# Patient Record
Sex: FEMALE | Race: WHITE | NOT HISPANIC OR LATINO | Employment: OTHER | URBAN - METROPOLITAN AREA
[De-identification: names, ages, dates, MRNs, and addresses within clinical notes are randomized per-mention and may not be internally consistent; named-entity substitution may affect disease eponyms.]

---

## 2017-01-09 ENCOUNTER — GENERIC CONVERSION - ENCOUNTER (OUTPATIENT)
Dept: OTHER | Facility: OTHER | Age: 72
End: 2017-01-09

## 2017-02-01 ENCOUNTER — TRANSCRIBE ORDERS (OUTPATIENT)
Dept: ADMINISTRATIVE | Facility: HOSPITAL | Age: 72
End: 2017-02-01

## 2017-02-01 ENCOUNTER — APPOINTMENT (OUTPATIENT)
Dept: LAB | Facility: HOSPITAL | Age: 72
End: 2017-02-01
Attending: INTERNAL MEDICINE
Payer: MEDICARE

## 2017-02-01 ENCOUNTER — HOSPITAL ENCOUNTER (OUTPATIENT)
Dept: MRI IMAGING | Facility: HOSPITAL | Age: 72
Discharge: HOME | End: 2017-02-01
Attending: INTERNAL MEDICINE
Payer: MEDICARE

## 2017-02-01 DIAGNOSIS — E03.9 HYPOTHYROIDISM: ICD-10-CM

## 2017-02-01 DIAGNOSIS — E78.5 HYPERLIPIDEMIA: ICD-10-CM

## 2017-02-01 DIAGNOSIS — M54.2 CERVICALGIA: ICD-10-CM

## 2017-02-01 LAB
CHOLEST SERPL-MCNC: 246 MG/DL (ref 50–200)
HDLC SERPL-MCNC: 71 MG/DL (ref 40–60)
LDLC SERPL CALC-MCNC: 156 MG/DL (ref 0–100)
TRIGL SERPL-MCNC: 97 MG/DL
TSH SERPL DL<=0.05 MIU/L-ACNC: 0.67 UIU/ML (ref 0.36–3.74)

## 2017-02-01 PROCEDURE — 36415 COLL VENOUS BLD VENIPUNCTURE: CPT

## 2017-02-01 PROCEDURE — 84443 ASSAY THYROID STIM HORMONE: CPT

## 2017-02-01 PROCEDURE — 72141 MRI NECK SPINE W/O DYE: CPT

## 2017-02-01 PROCEDURE — 80061 LIPID PANEL: CPT

## 2017-02-22 ENCOUNTER — GENERIC CONVERSION - ENCOUNTER (OUTPATIENT)
Dept: OTHER | Facility: OTHER | Age: 72
End: 2017-02-22

## 2017-02-24 ENCOUNTER — ALLSCRIPTS OFFICE VISIT (OUTPATIENT)
Dept: OTHER | Facility: OTHER | Age: 72
End: 2017-02-24

## 2017-02-24 ENCOUNTER — GENERIC CONVERSION - ENCOUNTER (OUTPATIENT)
Dept: OTHER | Facility: OTHER | Age: 72
End: 2017-02-24

## 2017-02-24 ENCOUNTER — GENERIC CONVERSION - ENCOUNTER (OUTPATIENT)
Dept: INTERNAL MEDICINE CLINIC | Facility: CLINIC | Age: 72
End: 2017-02-24

## 2017-02-24 DIAGNOSIS — E03.9 HYPOTHYROIDISM: ICD-10-CM

## 2017-02-24 DIAGNOSIS — E78.5 HYPERLIPIDEMIA: ICD-10-CM

## 2017-04-24 ENCOUNTER — APPOINTMENT (OUTPATIENT)
Dept: LAB | Facility: CLINIC | Age: 72
End: 2017-04-24
Payer: MEDICARE

## 2017-04-24 ENCOUNTER — TRANSCRIBE ORDERS (OUTPATIENT)
Dept: LAB | Facility: CLINIC | Age: 72
End: 2017-04-24

## 2017-04-24 DIAGNOSIS — E78.5 HYPERLIPIDEMIA: ICD-10-CM

## 2017-04-24 DIAGNOSIS — E03.9 HYPOTHYROIDISM: ICD-10-CM

## 2017-04-24 LAB
ALBUMIN SERPL BCP-MCNC: 3.8 G/DL (ref 3.5–5)
ALP SERPL-CCNC: 110 U/L (ref 46–116)
ALT SERPL W P-5'-P-CCNC: 52 U/L (ref 12–78)
AST SERPL W P-5'-P-CCNC: 35 U/L (ref 5–45)
BILIRUB DIRECT SERPL-MCNC: 0.09 MG/DL (ref 0–0.2)
BILIRUB SERPL-MCNC: 0.28 MG/DL (ref 0.2–1)
CHOLEST SERPL-MCNC: 141 MG/DL (ref 50–200)
HDLC SERPL-MCNC: 63 MG/DL (ref 40–60)
LDLC SERPL CALC-MCNC: 64 MG/DL (ref 0–100)
PROT SERPL-MCNC: 6.5 G/DL (ref 6.4–8.2)
TRIGL SERPL-MCNC: 69 MG/DL

## 2017-04-24 PROCEDURE — 36415 COLL VENOUS BLD VENIPUNCTURE: CPT

## 2017-04-24 PROCEDURE — 80061 LIPID PANEL: CPT

## 2017-04-24 PROCEDURE — 80076 HEPATIC FUNCTION PANEL: CPT

## 2017-04-25 ENCOUNTER — ALLSCRIPTS OFFICE VISIT (OUTPATIENT)
Dept: OTHER | Facility: OTHER | Age: 72
End: 2017-04-25

## 2017-04-25 ENCOUNTER — GENERIC CONVERSION - ENCOUNTER (OUTPATIENT)
Dept: OTHER | Facility: OTHER | Age: 72
End: 2017-04-25

## 2017-04-25 DIAGNOSIS — E78.5 HYPERLIPIDEMIA: ICD-10-CM

## 2017-04-25 DIAGNOSIS — E03.9 HYPOTHYROIDISM: ICD-10-CM

## 2017-04-25 DIAGNOSIS — R19.7 DIARRHEA: ICD-10-CM

## 2017-05-03 ENCOUNTER — HOSPITAL ENCOUNTER (OUTPATIENT)
Dept: ULTRASOUND IMAGING | Facility: HOSPITAL | Age: 72
Discharge: HOME | End: 2017-05-03
Attending: INTERNAL MEDICINE
Payer: MEDICARE

## 2017-05-03 DIAGNOSIS — R19.7 DIARRHEA: ICD-10-CM

## 2017-05-03 PROCEDURE — 76705 ECHO EXAM OF ABDOMEN: CPT

## 2017-08-28 ENCOUNTER — APPOINTMENT (OUTPATIENT)
Dept: LAB | Facility: CLINIC | Age: 72
End: 2017-08-28
Payer: MEDICARE

## 2017-08-28 ENCOUNTER — TRANSCRIBE ORDERS (OUTPATIENT)
Dept: LAB | Facility: CLINIC | Age: 72
End: 2017-08-28

## 2017-08-28 DIAGNOSIS — E78.5 HYPERLIPIDEMIA: ICD-10-CM

## 2017-08-28 DIAGNOSIS — E03.9 HYPOTHYROIDISM: ICD-10-CM

## 2017-08-28 LAB
ALBUMIN SERPL BCP-MCNC: 3.5 G/DL (ref 3.5–5)
ALP SERPL-CCNC: 117 U/L (ref 46–116)
ALT SERPL W P-5'-P-CCNC: 47 U/L (ref 12–78)
AST SERPL W P-5'-P-CCNC: 29 U/L (ref 5–45)
BILIRUB DIRECT SERPL-MCNC: 0.17 MG/DL (ref 0–0.2)
BILIRUB SERPL-MCNC: 0.61 MG/DL (ref 0.2–1)
LDLC SERPL DIRECT ASSAY-MCNC: 69 MG/DL (ref 0–100)
PROT SERPL-MCNC: 6.3 G/DL (ref 6.4–8.2)
TSH SERPL DL<=0.05 MIU/L-ACNC: 1 UIU/ML (ref 0.36–3.74)

## 2017-08-28 PROCEDURE — 83721 ASSAY OF BLOOD LIPOPROTEIN: CPT

## 2017-08-28 PROCEDURE — 84443 ASSAY THYROID STIM HORMONE: CPT

## 2017-08-28 PROCEDURE — 80076 HEPATIC FUNCTION PANEL: CPT

## 2017-08-28 PROCEDURE — 36415 COLL VENOUS BLD VENIPUNCTURE: CPT

## 2017-08-30 ENCOUNTER — ALLSCRIPTS OFFICE VISIT (OUTPATIENT)
Dept: OTHER | Facility: OTHER | Age: 72
End: 2017-08-30

## 2017-08-30 DIAGNOSIS — R00.1 BRADYCARDIA: ICD-10-CM

## 2017-08-30 DIAGNOSIS — E78.5 HYPERLIPIDEMIA: ICD-10-CM

## 2017-10-17 ENCOUNTER — ALLSCRIPTS OFFICE VISIT (OUTPATIENT)
Dept: OTHER | Facility: OTHER | Age: 72
End: 2017-10-17

## 2017-10-17 DIAGNOSIS — E78.5 HYPERLIPIDEMIA: ICD-10-CM

## 2017-10-17 DIAGNOSIS — R20.8 OTHER DISTURBANCES OF SKIN SENSATION: ICD-10-CM

## 2017-10-17 DIAGNOSIS — E03.9 HYPOTHYROIDISM: ICD-10-CM

## 2017-10-17 DIAGNOSIS — M81.0 AGE-RELATED OSTEOPOROSIS WITHOUT CURRENT PATHOLOGICAL FRACTURE: ICD-10-CM

## 2017-10-18 ENCOUNTER — GENERIC CONVERSION - ENCOUNTER (OUTPATIENT)
Dept: OTHER | Facility: OTHER | Age: 72
End: 2017-10-18

## 2017-10-23 ENCOUNTER — GENERIC CONVERSION - ENCOUNTER (OUTPATIENT)
Dept: OTHER | Facility: OTHER | Age: 72
End: 2017-10-23

## 2017-11-01 NOTE — CONSULTS
Assessment  1  Cervicogenic headache (784 0) (R51)   2  Allodynia (782 0) (R20 8)   3  Migraine, unspecified, not intractable, without status migrainosus (346 90) (G43 909)    Plan  Allodynia    · (1) COMPREHENSIVE METABOLIC PANEL; Status:Active; Requested BGE:[id number];    Perform:[facility]; Due:17Oct2018; Ordered;Ordered By:Adelina Saenz;  Allodynia, Cervicogenic headache    · CarBAMazepine  MG Oral Tablet Extended Release 12 Hour (TEGretol-XR); Take 1 tab BID x 1 week, then increase to 2 tabs BID   Rx By: Krystal Reese; Dispense: 0 Days ; #E#:60 Tablet Extended Release 12 Hour; Refill: 5;Allodynia, Cervicogenic headache; SIVA = N; Faxed To: [id number]  Discussion/Summary  Discussion Summary:   Migraine with aura without status migrainosus twice a monthCan continue to take Fioricet abortively as this works well for her  We discussed if her migraines become more frequent, discussed notifying me immediately as we could try different medication then  Discussed that frequent use of this medication 3/more times a week for several weeks, can cause rebound headache  At this time, this is not a concern as she uses it twice a month at a maximum  Given this frequency no need for preventative treatment at this time  neuralgia and allodynia and neuralgiform mild to moderate pain daily in the right temporo-occipital region  Tegretol 100 mg daily x 1 week, increased to 100 BID  Repeat CMP in 3 months time to monitor for pancytopenia, hyponatremia, elevated liver enzymes  MRI C spine 2/2017 with no spinal stenosis or other significant abnormalities noted  She has mild narrowing of the C5-6 foramen- which could be contributory but would not completely explain her current symptoms  have reviewed her MRI brain and MRA head done in 2014 when this first started and this did not show any acute changes or significant structural abnormalities   She has no new features to her headaches, thus will no repeat neuroimaging at this time  If the nature of her headaches change, then will obtain repeat MRI brain  Counseling Documentation With Imm: The patient was counseled regarding  Medication SE Review and Pt Understands Tx: Possible side effects of new medications were reviewed with the patient/guardian today  The treatment plan was reviewed with the patient/guardian  The patient/guardian understands and agrees with the treatment plan   Headache [facility]:  The patient was counseled regarding;   Discussed side effects of all medications prescribed today to the patient in detail  Patient education was completed today and we also discussed precautions for rebound headaches  --   When patient has a moderate to severe headache, they should seek rest, initiate relaxation and apply cold compresses to the head  Also recommended to the patient :  1  Maintain regular sleep schedule -- 2  Limit over the counter medications  (No more than 3 times a week)  -- 3  Maintain headache diary  -- 4  Limit caffeine to 1-2 cups a day or less  -- 5  Avoid dietary trigger  (list given to the patient and reviewed with them)  -- 6  Patient is to have regular frequent meals to prevent headache onset  Chief Complaint  Chief Complaint Free Text Note Form: Patient presents for a consultation for right side head pain  History of Present Illness  HPI: Ms Maria C Miramontes is a pleasant 67 yo female seen in consultation for headaches starting March 2014, states then had a thunderclap headache that almost knocked her over, states that was quick but had a lower grade headache- 4/10 headache the rest of the day with photophobia but no other associated deficits  She states she had no associated paresis, vision changes, vertigo or word finding or cognitive deficits then  states this sudden sharp headache occurred right after she turned her head to the right   At this point, she was recovering from an infection on her frontal scalp s/p moes procedure per her  States the Edison's procedure was in the midfrontal area- not the right temporo-occipital region where she has the pain  since then has had almost daily headaches now mostly mild which she can work through- states when wind or cool temperatures touch her head, she has severe pain, states brushing her hair does not bother her  States has a tingling sensation in this area as well if she avoid the cool temperatures or keeps her scalp warm with a hat or scarf she will not have the severe pain regards to migraines,has a migraine once a month where she has vision changes as her aura, has dull moderately severe headache, photophobia and phonophobia, no emesis  States she goes and sleeps in a quiet dark room after taking Fioricet, and this resolves her headache  States has mild nausea  States occasional rhinorrhea with her aura as well however tells me this is not new  Denies migraines or regular headaches earlier in her life  tried: Neurontin, Lyrica, Elavil, Topamax- adverse effects of blurry vision, diarrhea (15 times/day)weather changes, wind and cool causes allodynia to that region of her headwhen she was young (childhood) had a skull fracture on the left side with no residual deficits  family history of migraines  No personal history of regular headaches/migraines earlier in life  personal or family history of strokes, seizures, intracranial bleeds, or aneurysm rupture  tobacco use, significant alcohol use and denies drug use  HTN, CAD, arrhythmias  Denies sleeping difficulties  Denies balance troubles or falls  States had positional dizziness with headaches in 2014 but no longer  States no longer takes Meclizine  Neurology [facility]:  Headache: On a scale of 0-10, the pain severity is a 1  the headaches started at age 78   no accidents or injury prior to the onset of headaches  Headaches are occurring Mild headaches occur daily, migraines once in 1-2 months-- and-- during various time of the day  headache lasts for hour(s)  Currently the pain is on the entire right side,-- in the temporal region-- and-- in the occipital region  Warning(s) prior to headache include photopsia-- and-- rhinorrhea  Usual headache is described as throbbing, sharp, electric and dull  Associated symptoms include photophobia,-- phonophobia,-- loss of appetite,-- nausea,-- runny or stuffed up nose,-- with darkness,-- stiff or sore neck,-- inability to work-- and-- the associated features occur about once a month with her migraines not with her daily dull headaches, but-- no tinnitus,-- no lacrimation,-- no sensitivity to smell,-- no flushing,-- no blurred vision,-- no vomiting,-- no lightheadedness or dizziness,-- no tingling or numbness of the hands-- and-- no tingling or numbness of the feet  Headaches are made worse by not while coughing,-- not while sneezing,-- not while moving bowel,-- not while running or exercising,-- not while bending over-- and-- not while walking  Headaches are triggered by changes in the weather, but-- not by fatigue,-- not stress/tension,-- not eating certain foods,-- not while drinking alcohol,-- not with certain medication,-- not by coughing,-- not by chewing,-- not while oversleeping-- and-- not when lying down  Review of Systems  Neurological ROS:  Constitutional: recent weight loss  HEENT: blurred vision  Cardiovascular:  no chest pain or pressure, no palpitations present, the heart rate was not rapid or irregular, no swelling in the arms or legs, no poor circulation  Respiratory:  no unusual or persistant cough, no shortness of breath with or without exertion  Gastrointestinal:  no nausea, no vomiting, no diarrhea, no abdominal pain, no changes in bowel habits, no melena, no loss of bowel control  Genitourinary:  no incontinence, no feelings of urinary urgency, no increase in frequency, no urinary hesitancy, no dysuria, no hematuria  Musculoskeletal: head/neck/back pain  Integumentary  no masses, no rash, no skin lesions, no livedo reticularis  Psychiatric:  no anxiety, no depression, no mood swings, no psychiatric hospitalizations, no sleep problems  Endocrine  no unusual weight loss or gain, no excessive urination, no excessive thirst, no hair loss or gain, no hot or cold intolerance, no menstrual period change or irregularity, no loss of sexual ability or drive, no erection difficulty, no nipple discharge  Hematologic/Lymphatic:  no unusual bleeding, no tendency for easy bruising, no clotting skin or lumps  Neurological General: headache,-- lightheadedness,-- snoring-- and-- waking up at night  Neurological Mental Status:  no confusion, no mood swings, no alteration or loss of consciousness, no difficulty expressing/understanding speech, no memory problems  Neurological Cranial Nerves: vertigo or dizziness  Neurological Motor findings include:  no tremor, no twitching, no cramping(pre/post exercise), no atrophy  Neurological Coordination: balance difficulties  Neurological Sensory:  no numbness, no pain, no tingling, does not fall when eyes closed or taking a shower  Neurological Gait:  no difficulty walking, not falling to one side, no sensation of being pushed, has not had falls  ROS Reviewed:   ROS reviewed  Active Problems    1  Abnormal loss of weight (783 21) (R63 4)   2  Advance directive discussed with patient (V65 49) (Z71 89)   3  Atypical chest pain (786 59) (R07 89)   4  Bradycardia (427 89) (R00 1)   5  Depression with anxiety (300 4) (F41 8)   6  Encounter for other general examination (V70 8) (Z00 8)   7  Encounter for screening colonoscopy (V76 51) (Z12 11)   8  Encounter for screening mammogram for malignant neoplasm of breast (V76 12) (Z12 31)   9  Esophageal reflux (530 81) (K21 9)   10  Glaucoma (365 9) (H40 9)   11  History of malignant neoplasm of colon (V10 05) (Z85 038)   12  Hyperlipidemia (272 4) (E78 5)   13  Hypothyroidism (244 9) (E03 9)   14  Lumbar disc displacement without myelopathy (722 10) (M51 26)   15  Lumbar radiculopathy (724 4) (M54 16)   16  Migraine, unspecified, not intractable, without status migrainosus (346 90) (G43 909)   17  Need for influenza vaccination (V04 81) (Z23)   18  Osteoporosis (733 00) (M81 0)   19  Pernicious anemia (281 0) (D51 0)    Past Medical History    1  History of Allergic rhinitis (477 9) (J30 9)   2  History of Allergic rhinitis (477 9) (J30 9)   3  History of Backache (724 5) (M54 9)   4  History of Chronic constipation (564 00) (K59 09)   5  History of Chronic sinusitis, unspecified location (473 9) (J32 9)   6  History of Encounter for screening colonoscopy (V76 51) (Z12 11)   7  History of Encounter for screening mammogram for malignant neoplasm of breast (V76 12) (Z12 31)   8  History of Headache (784 0) (R51)   9  History of hypercholesterolemia (V12 29) (Z86 39)   10  History of malignant neoplasm of colon (V10 05) (Z85 038)   11  History of thyroid disease (V12 29) (Z86 39)   12  History of Insomnia (780 52) (G47 00)   13  History of Lower back pain (724 2) (M54 5)   14  History of Migraine, hemiplegic, with status migrainosus (346 32) (G43 401)   15  History of Need for prophylactic vaccination and inoculation against influenza (V04 81)  (Z23)   16  History of Neurodermatitis (698 3) (L28 0)   17  History of Thrush, oral (112 0) (B37 0)   18  History of Vertigo (780 4) (R42)   19  History of Visit for screening mammogram (T30 71) (Z12 31)  Active Problems And Past Medical History Reviewed: The active problems and past medical history were reviewed and updated today  Surgical History  1  History of Appendectomy   2  History of Breast Surgery Puncture Aspiration Of Cyst Right Breast   3  History of Hemicolectomy   4  History of Hysterectomy   5  History of Mohs Micrographic Surgery Head   6  History of Oophorectomy   7  History of Partial Colectomy    Family History  Mother    1   Family history of Colon Cancer (V16 0)   2  Family history of Lung Cancer (V16 1)   3  Family history of Stroke Syndrome (V17 1)  Father    4  Family history of Esophageal Cancer (V16 0)  Family History    5  Family history of Breast Cancer (V16 3)   6  Family history of Coronary Artery Disease (V17 49)   7  Denied: Family history of Drug use  Family History Reviewed: The family history was reviewed and updated today  Social History     · Always uses sunscreen   · Being A Social Drinker   · Caffeine use (V49 89) (F15 90)   · Dental care, regularly   · Lives independently with spouse   · Never a smoker   · No drug use   · Uses Safety Equipment - Seatbelts  Social History Reviewed: The social history was reviewed and updated today  Current Meds   1  Atorvastatin Calcium 40 MG Oral Tablet; TAKE 1 TABLET BY MOUTH ONCE DAILY  Requested for: 28QZK0485; Last Rx:15Jun2017 Ordered   2  Butalbital-APAP-Caffeine -40 MG Oral Capsule; TAKE 1 EVERY EIGHT HOURS PRN FOR HA; Therapy: 43EHJ6059 to (Evaluate:59Hqk1548)  Requested for: 00Kgs8748; Last Rx:73Azu9882 Ordered   3  Ibuprofen 800 MG Oral Tablet; TAKE 1 TABLET 3 TIMES DAILY AS NEEDED; Therapy: 34NDW7518 to (21 )  Requested for: 75NKM1287; Last BM:34YDH8203 Ordered   4  Levothyroxine Sodium 75 MCG Oral Tablet; TAKE 1 TABLET DAILY; Therapy: 04RKY3012 to ([facility])  Requested for: 79Klz4759; Last Rx:87Wmn3487 Ordered   5  Meclizine HCl - 25 MG Oral Tablet; TAKE 1 TABLET Every 8 hours PRN; Therapy: 85IMU1297 to (Evaluate:35Nxr6310)  Requested for: 20BRP9232; Last Rx:74Vuu3439 Ordered   6  Rosuvastatin Calcium 5 MG Oral Tablet; Take 1 tablet daily; Therapy: 47Rrj8582 to (Last Rx:93Nrx7915)  Requested for: 20KLX5799 Ordered    Allergies  1  Avelox TABS   2  Demerol TABS   3  Erythromycin Derivatives    4   Shellfish    Vitals  Signs   Recorded: 41LNP0363 11:59AM   Heart Rate: 60  Respiration: 14  Systolic: 241  Diastolic: 64  Height: 5 ft 3 5 in  Weight: 118 lb   BMI Calculated: 20 58  BSA Calculated: 1 55  O2 Saturation: 98    Physical Exam   Constitutional  General Appearance: Appears appropriate for age, healthy, well developed, appropriately groomed and appropriately dressed   Eyes  Ophthalmoscopic examination: Vision is grossly normal  Gross visual field testing by confrontation shows no abnormalities  EOMI in both eyes  Conjunctivae clear  Eyelids normal palpebral fissures equal  Orbits exhibit normal position  No discharge from the eyes  PERRL  External inspection of ears and nose: Normal      Neck  Neck and thyroid: Normal to inspection and palpation  Pulmonary  Respiratory effort: Lungs are clear bilaterally  Cardiovascular  Auscultation of heart: Rate is regular  Rhythm is regular  Peripheral vascular exam: Normal pulses throughout, no tenderness, erythema or swelling  Musculoskeletal  Gait and station: Abnormal  -- Romberg with mild sway  Muscle strength: Normal strength throughout  Muscle tone: No atrophy, abnormal movements, flaccidity, cogwheeling or spasticity  Range of motion: Normal     Stability: Normal     Inspection of temporomandibular joint appears normal    Neurologic  Orientation to person, place, and time: Normal    Attention span and concentration: Normal thought process and attention span  Language: Names objects, able to repeat phrases and speaks spontaneously  Fund of knowledge: Normal vocabulary with appropriate knowledge of current events and past history  Sensation: Intact sensation to pinprick, temperature, vibration, and proprioception in all four extremities  Reflexes: DTR's are normal and symmetric bilaterally  Babinski's reflex is negative bilaterally  No pathologic ankle clonus  Coordination: Cerebellum function intact  No involuntary movement or psychomotor activity  Motor System: No pronator drift  Upper Extremities: Normal to inspection  No tenderness over the upper extremities bilaterally   No instability bilaterally  Strength: Motor strength is 5/5 bilaterally  Normal muscle tone bilaterally  Muscle bulk: Muscle bulk is normal bilaterally  Full ROM bilaterally  Lower Extremities: Normal to inspection and palpation  No tenderness of the lower extremities bilaterally  Exhibits no instability bilaterally  Strength: Motor strength is 5/5 bilaterally  Normal muscle tone bilaterally  Muscle Bulk: Muscle bulk is normal bilaterally  Full ROM bilaterally  Cranial Nerve Exam  II: Normal with no deficit  III,IV, VI: Normal with no deficit  V: Normal with no deficit  VII: Normal with no deficit  VIII: Normal with no deficit  IX: Normal with no deficit  X: Normal with no deficit  XI: Normal with no deficit  XII: Normal with no deficit  Recent and remote memory: Intact      Mood and affect: Normal        Future Appointments    Date/Time Provider Specialty Site   11/10/2017 10:15 AM Ruby Austin DO Internal Medicine [facility] 64   01/17/2018 01:30 PM Kewrin Tucker MD Neurology [facility] 176       Signatures   Electronically signed by : Geo Diaz MD; Oct 17 2017  1:26PM EST                       (Author)

## 2017-11-10 ENCOUNTER — ALLSCRIPTS OFFICE VISIT (OUTPATIENT)
Dept: OTHER | Facility: OTHER | Age: 72
End: 2017-11-10

## 2017-11-11 NOTE — PROGRESS NOTES
Assessment    1  Irritable bowel syndrome (564 1) (K58 9)  2  Lumbar radiculopathy (724 4) (M54 16)  3  Depression with anxiety (300 4) (F41 8)  4  Esophageal reflux (530 81) (K21 9)    Plan  Hyperlipidemia    · (1) LIPID PANEL, FASTING; Status:Active; Requested for:10Nov2017;   Hyperlipidemia, Hypothyroidism    · (1) COMPREHENSIVE METABOLIC PANEL; Status:Active; Requested for:10Nov2017;   Hyperlipidemia, Hypothyroidism, Irritable bowel syndrome    · Follow-up visit in 4 Months Evaluation and Treatment  Follow-up  Status: Hold For - Scheduling Requested for: 40SAC5540  Hypothyroidism    · (1) TSH; Status:Active; Requested for:10Nov2017;   Osteoporosis    · (1) VITAMIN D 25-HYDROXY; Status:Active; Requested for:10Nov2017;     Discussion/Summary  Discussion Summary:   Increase fiber, walk as tolerated  Deferred prevnar, aware of shingrix  Continue statin, labs reviewed  Has optho followup  Rto 6 months/prn1     Medication SE Review and Pt Understands Tx: Possible side effects of new medications were reviewed with the patient/guardian today  The treatment plan was reviewed with the patient/guardian  The patient/guardian understands and agrees with the treatment plan    Self Referrals:   Self Referrals: No       1 Amended By: Malorie Narayan; Nov 10 2017 9:26 PM EST    Chief Complaint  Chief Complaint Free Text Note Form: Pt presents for routine f/up exam  Pt feels well today  No falls  No refills needed today  Appetite is normal per patient  Chief Complaint Chronic Condition [facility]: Patient is here today for follow up of chronic conditions described in HPI  History of Present Illness  HPI: Pt doing ok  Ongoing concern for eye sxs,pressures but has been stable  No new sxs  No chest pain  Limited activity due to eye issues  No falls  Back sxs tolerable  Bowels 1  1,2  fluctuate, loose now  Tolerating statin2        1 Amended By: Malorie Narayan; Nov 10 2017 9:24 PM EST   2 Amended By: Malorie Narayan;  Nov 10 2017 9:26 PM EST    Review of Systems  Complete-Female:  Constitutional:1  No fever, no chills, feels well, no tiredness, no recent weight gain or weight loss1   Eyes:1  eyesight problems1   ENT:1  no complaints of earache, no loss of hearing, no nose bleeds, no nasal discharge, no sore throat, no hoarseness1   Cardiovascular:1  No complaints of slow heart rate, no fast heart rate, no chest pain, no palpitations, no leg claudication, no lower extremity edema1   Respiratory:1  No complaints of shortness of breath, no wheezing, no cough, no SOB on exertion, no orthopnea, no PND1   Gastrointestinal:1  No complaints of abdominal pain, no constipation, no nausea or vomiting, no diarrhea, no bloody stools1   Genitourinary:1  No complaints of dysuria, no incontinence, no pelvic pain, no dysmenorrhea, no vaginal discharge or bleeding1   Musculoskeletal:1  arthralgias1 ,-- myalgias1 -- and-- joint stiffness1   Integumentary:1  No complaints of skin rash or lesions, no itching, no skin wounds, no breast pain or lump1   Neurological:1  No complaints of headache, no confusion, no convulsions, no numbness, no dizziness or fainting, no tingling, no limb weakness, no difficulty walking1   Psychiatric:1  Not suicidal, no sleep disturbance, no anxiety or depression, no change in personality, no emotional problems1   Endocrine:1  No complaints of proptosis, no hot flashes, no muscle weakness, no deepening of the voice, no feelings of weakness1   Hematologic/Lymphatic:1  No complaints of swollen glands, no swollen glands in the neck, does not bleed easily, does not bruise easily1         1 Amended By: Caryn Caicedo; Nov 10 2017 9:25 PM EST    Active Problems  1  Abnormal loss of weight (783 21) (R63 4)  2  Advance directive discussed with patient (V65 49) (Z71 89)  3  Allodynia (782 0) (R20 8)  4  Atypical chest pain (786 59) (R07 89)  5  Bradycardia (427 89) (R00 1)  6  Cervicogenic headache (784 0) (R51)  7  Depression with anxiety (300 4) (F41 8)  8  Encounter for other general examination (V70 8) (Z00 8)  9  Encounter for screening colonoscopy (V76 51) (Z12 11)  10  Encounter for screening mammogram for malignant neoplasm of breast (V76 12) (Z12 31)  11  Esophageal reflux (530 81) (K21 9)  12  Glaucoma (365 9) (H40 9)  13  History of malignant neoplasm of colon (V10 05) (Z85 038)  14  Hyperlipidemia (272 4) (E78 5)  15  Hypothyroidism (244 9) (E03 9)  16  Lumbar disc displacement without myelopathy (722 10) (M51 26)  17  Lumbar radiculopathy (724 4) (M54 16)  18  Migraine, unspecified, not intractable, without status migrainosus (346 90) (G43 909)  19  Need for influenza vaccination (V04 81) (Z23)  20  Osteoporosis (733 00) (M81 0)  21  Pernicious anemia (281 0) (D51 0)    Past Medical History  1  History of hypercholesterolemia (V12 29) (Z86 39)  2  History of malignant neoplasm of colon (V10 05) (Z85 038)  3  History of Insomnia (780 52) (G47 00)  4  History of Neurodermatitis (698 3) (L28 0)  Active Problems And Past Medical History Reviewed: The active problems and past medical history were reviewed and updated today  Surgical History  1  History of Appendectomy  2  History of Breast Surgery Puncture Aspiration Of Cyst Right Breast  3  History of Hemicolectomy  4  History of Hysterectomy  5  History of Mohs Micrographic Surgery Head  6  History of Oophorectomy  7  History of Partial Colectomy  Surgical History Reviewed: The surgical history was reviewed and updated today  Family History  Mother   1  Family history of Colon Cancer (V16 0)  2  Family history of Lung Cancer (V16 1)  3  Family history of Stroke Syndrome (V17 1)  Father   4  Family history of Esophageal Cancer (V16 0)  Family History   5  Family history of Breast Cancer (V16 3)  6  Family history of Coronary Artery Disease (V17 49)  7  Denied: Family history of Drug use  Family History Reviewed:    The family history was reviewed and updated today  Social History     · Always uses sunscreen   · Being A Social Drinker   · Caffeine use (V49 89) (F15 90)   · Dental care, regularly   · Lives independently with spouse   · Never a smoker   · No drug use   · Uses Safety Equipment - Seatbelts  Social History Reviewed: The social history was reviewed and updated today  The social history was reviewed and is unchanged  Current Meds  1  Baclofen 10 MG Oral Tablet; Take half tab nightly x 1 week, then 1 tab nightly; Therapy: 13KTP4594 to (Last Rx:23Oct2017) Ordered  2  Butalbital-APAP-Caffeine -40 MG Oral Capsule; TAKE 1 EVERY EIGHT HOURS PRN FOR HA; Therapy: 64QTA6821 to (Evaluate:01Fhq1453)  Requested for: 18Oia8663; Last Rx:00Blc8308 Ordered  3  CarBAMazepine  MG Oral Tablet Extended Release 12 Hour; Take 1 tab BID x 1 week, then increase to 2 tabs BID; Therapy: 34NMR6858 to (Last Rx:17Oct2017) Ordered  4  Combigan 0 2-0 5 % Ophthalmic Solution; Therapy: (Recorded:10Nov2017) to Recorded  5  Ibuprofen 800 MG Oral Tablet; TAKE 1 TABLET 3 TIMES DAILY AS NEEDED; Therapy: 31PTL2489 to (Elizabeth Taylor)  Requested for: 63NGU9197; Last Rx:51Lua5452 Ordered  6  Levothyroxine Sodium 75 MCG Oral Tablet; TAKE 1 TABLET DAILY; Therapy: 17LEV9416 to (Yasmin Cohen)  Requested for: 66Rtp4037; Last Rx:05Nuj9689 Ordered  7  Meclizine HCl - 25 MG Oral Tablet; TAKE 1 TABLET Every 8 hours PRN; Therapy: 62YHF4270 to (Evaluate:97Nnf0441)  Requested for: 85JWQ8395; Last Rx:31Jan2017 Ordered  8  Rosuvastatin Calcium 5 MG Oral Tablet; Take 1 tablet daily; Therapy: 96Gak2042 to (Last Rx:48Nqv8233)  Requested for: 65YCP0499 Ordered  Medication List Reviewed: The medication list was reviewed and updated today  Allergies  1  Avelox TABS  2  Demerol TABS  3  Erythromycin Derivatives  4   Shellfish    Vitals  Vital Signs    Recorded: 12TNO2269 09:25PM Recorded: 78ZJZ0596 09:53AM   Temperature 1 95 F, Tympanic   Systolic 5272 1   Diastolic 246 1 Height 1 5 ft 3 5 in   Weight 1 122 lb 6 oz   BMI Calculated 1 21 34   BSA Calculated 1 1 58        1  Amended By: Governor Dize; Nov 10 2017 9:25 PM EST   Physical Exam   Constitutional1   General appearance: No acute distress, well appearing and well nourished  1   Eyes1   Conjunctiva and lids: No swelling, erythema or discharge  1   Pupils and irises: Equal, round and reactive to light  1   Ears, Nose, Mouth, and Throat1   External inspection of ears and nose: Normal 1   Otoscopic examination: Tympanic membranes translucent with normal light reflex  Canals patent without erythema  1   Nasal mucosa, septum, and turbinates: Normal without edema or erythema  1   Oropharynx: Normal with no erythema, edema, exudate or lesions  1   Pulmonary1   Respiratory effort: No increased work of breathing or signs of respiratory distress  1   Auscultation of lungs: Clear to auscultation  1   Cardiovascular1   Palpation of heart: Normal PMI, no thrills  1   Auscultation of heart: Normal rate and rhythm, normal S1 and S2, without murmurs  1   Examination of extremities for edema and/or varicosities: Normal 1   Carotid pulses: Normal 1   Abdomen1   Abdomen: Non-tender, no masses  1   Liver and spleen: No hepatomegaly or splenomegaly  1   Lymphatic1   Palpation of lymph nodes in neck: No lymphadenopathy  1   Musculoskeletal1   Gait and station: Normal 1   Digits and nails: Normal without clubbing or cyanosis  1   Inspection/palpation of joints, bones, and muscles: Normal 1   Skin1   Skin and subcutaneous tissue: Normal without rashes or lesions  1   Neurologic1   Cranial nerves: Cranial nerves 2-12 intact  1   Reflexes: 2+ and symmetric  1   Sensation: No sensory loss  1   Psychiatric1   Orientation to person, place, and time: Normal 1   Mood and affect: Normal 1          1 Amended By: Governor Diez; Nov 10 2017 9:26 PM EST    Health Management  Encounter for screening colonoscopy   COLONOSCOPY; every 5 years;  Last 16ILR6489; Next Due: 01OVT0077; Active  Health Maintenance   Medicare Annual Wellness Visit; every 1 year; Last 14Wtm7770; Next Due: 43Pil6327;  Active    Future Appointments    Date/Time Provider Specialty Site   01/17/2018 01:30 PM Sintia Hampton MD Neurology [facility] 176       Signatures   Electronically signed by : Cristina Landry DO; Nov 10 2017 10:28AM EST                       (Author)    Electronically signed by : Cristina Landry DO; Nov 10 2017  9:27PM EST                       (Author)

## 2018-01-07 DIAGNOSIS — Z12.31 ENCOUNTER FOR SCREENING MAMMOGRAM FOR MALIGNANT NEOPLASM OF BREAST: ICD-10-CM

## 2018-01-10 NOTE — PROGRESS NOTES
Assessment    1  Bradycardia (427 89) (R00 1)    Plan  Bradycardia    · 3 - [facility] (CARDIOLOGY ) Co-Management  *  Status: Hold For - Scheduling   Requested for: 30Aug2017  are Referring to a non- Preferred Provider : Established Patient  Care Summary provided  : Yes  Bradycardia, Hyperlipidemia, Hypothyroidism    · Follow-up visit in 3 months Evaluation and Treatment  Follow-up  Status: Hold For -  Scheduling  Requested for: 30Aug2017  Depression with anxiety    · *VB - PHQ-9 Tool; Status:Active - Retrospective By Protocol Authorization; Requested  for:30Aug2017; Health Maintenance    · There are many exercise options for seniors ; Status:Complete - Retrospective By  Protocol Authorization;   Done: 18ADE3474   · There ways to avoid falling ; Status:Complete - Retrospective By Protocol Authorization;    Done: 40RFR6760   · These are things you can do to prevent falls in and around the home ; Status:Complete -  Retrospective By Protocol Authorization;   Done: 75UXY2516   · We recommend you modify your diet to achieve and maintain a healthy weight  Being  underweight may increase your risk of developing health problems from vitamin and  mineral deficiencies  We recommend a balanced diet rich in fruits and vegetables  You  may also consider increasing your calorie intake by eating more frequently or adding  nuts, avocados, and low-fat cheese or milk to your meals  Please let us know  if you would like to learn more about your nutrition and calorie needs, and additional  options to help you achieve your weight goals ; Status:Complete - Retrospective By  Protocol Authorization;   Done: 13KHQ4060   · Medicare Annual Wellness Visit ; every 1 year; Last 30Aug2017; Next 30Aug2018;  Status:Active  Hyperlipidemia    · Rosuvastatin Calcium 5 MG Oral Tablet (Crestor); Take 1 tablet daily   · (1) LIPID PANEL, FASTING; Status:Active;  Requested for:30Aug2017;    · Fluzone High-Dose 0 5 ML Intramuscular Suspension Prefilled Syringe;  INJECT 0 5  ML Intramuscular; To Be Done: 75Geu0765  Hypothyroidism    · Levothyroxine Sodium 75 MCG Oral Tablet (Synthroid); TAKE 1 TABLET DAILY  Migraine, unspecified, not intractable, without status migrainosus    · Butalbital-APAP-Caffeine -40 MG Oral Capsule; TAKE 1 EVERY EIGHT  HOURS PRN FOR HA    Discussion/Summary  Impression: Subsequent Annual Wellness Visit  Self Referrals: No       Possible side effects of new medications were reviewed with the patient/guardian today  The treatment plan was reviewed with the patient/guardian  The patient/guardian understands and agrees with the treatment plan      Chief Complaint  Pt presents for WEll Exam today  Pt asking to be changed to a new chol  medication  States she can't take lipitor anymore  No falls  REfills done as requested  Appetite is normal per patient  She states she doesn't sleep much at night, this isn't new for her  Advance Directives  Advance Directive  [facility]:   The patient is in agreement to receive the Advance Care Planning service    YES - Patient has an advance health care directive  The patient has a living will located  in patient's home  Capacity/Competence: This patient has full decision making capacity for discussion of advance care planning and This patient has full decision making competency for discussion of advance care planning  History of Present Illness  Welcome to Medicare and Wellness Visits: The patient is being seen for the subsequent annual wellness visit  Drug and Alcohol Use: The patient has never smoked cigarettes  The patient reports never drinking alcohol  She has never used illicit drugs  Diet and Physical Activity: Current diet includes well balanced meals  She exercises daily  Exercise: walking  Advance Directives: Advance directives: living will and advance directives     Co-Managers and Medical Equipment/Suppliers: See Patient Care Team   Reviewed Updated [facility]: Last Medicare Wellness Visit Information was reviewed, patient interviewed, no change since last AWV  Preventive Quality Program 65 and Older: Falls Risk: The patient fell times in the past 12 months  The patient is currently asymptomatic Symptoms Include:  Associated symptoms:  No associated symptoms are reported  The patient currently has no urinary incontinence symptoms  Patient Care Team    Care Team Member Role Specialty Office Number   Lilo Morris DO Specialist Pain Management [phone]   Iqra Marquez, [address] [place] Specialist Pain Management [phone]   Patrick Herr MD Specialist Gastroenterology Adult  DO  Pain Management [phone]   [facility]  [facility] [phone]     Active Problems    1  Abnormal loss of weight (783 21) (R63 4)   2  Advance directive discussed with patient (V65 49) (Z71 89)   3  Atypical chest pain (786 59) (R07 89)   4  Depression with anxiety (300 4) (F41 8)   5  Encounter for other general examination (V70 8) (Z00 8)   6  Encounter for screening colonoscopy (V76 51) (Z12 11)   7  Encounter for screening mammogram for malignant neoplasm of breast (V76 12)   (Z12 31)   8  Esophageal reflux (530 81) (K21 9)   9  Glaucoma (365 9) (H40 9)   10  History of malignant neoplasm of colon (V10 05) (Z85 038)   11  Hyperlipidemia (272 4) (E78 5)   12  Hypothyroidism (244 9) (E03 9)   13  Lumbar disc displacement without myelopathy (722 10) (M51 26)   14  Lumbar radiculopathy (724 4) (M54 16)   15  Migraine, unspecified, not intractable, without status migrainosus (346 90) (G43 909)   16  Need for influenza vaccination (V04 81) (Z23)   17  Osteoporosis (733 00) (M81 0)   18   Pernicious anemia (281 0) (D51 0)    Past Medical History    · History of Allergic rhinitis (477 9) (J30 9)   · History of Allergic rhinitis (477 9) (J30 9)   · History of Backache (724 5) (M54 9)   · History of Chronic constipation (564 00) (K59 09)   · History of Chronic sinusitis, unspecified location (473 9) (J32 9)   · History of Encounter for screening colonoscopy (V76 51) (Z12 11)   · History of Encounter for screening mammogram for malignant neoplasm of breast  (V76 12) (Z12 31)   · History of Headache (784 0) (R51)   · History of hypercholesterolemia (V12 29) (Z86 39)   · History of malignant neoplasm of colon (V10 05) (Z85 038)   · History of thyroid disease (V12 29) (Z86 39)   · History of Insomnia (780 52) (G47 00)   · History of Lower back pain (724 2) (M54 5)   · History of Migraine, hemiplegic, with status migrainosus (346 32) (G43 401)   · History of Need for prophylactic vaccination and inoculation against influenza (V04 81)  (Z23)   · History of Neurodermatitis (698 3) (L28 0)   · History of Thrush, oral (112 0) (B37 0)   · History of Vertigo (780 4) (R42)   · History of Visit for screening mammogram (V76 12) (Z12 31)    The active problems and past medical history were reviewed and updated today  Surgical History    · History of Appendectomy   · History of Breast Surgery Puncture Aspiration Of Cyst Right Breast   · History of Hemicolectomy   · History of Hysterectomy   · History of Mohs Micrographic Surgery Head   · History of Oophorectomy   · History of Partial Colectomy    The surgical history was reviewed and updated today  Family History  Mother    · Family history of Colon Cancer (V16 0)   · Family history of Lung Cancer (V16 1)   · Family history of Stroke Syndrome (V17 1)  Father    · Family history of Esophageal Cancer (V16 0)  Family History    · Family history of Breast Cancer (V16 3)   · Family history of Coronary Artery Disease (V17 49)    The family history was reviewed and updated today         Social History    · Always uses sunscreen   · Being A Social Drinker   · Caffeine use (V49 89) (F15 90)   · Dental care, regularly   · Lives independently with spouse   · Never a smoker   · No drug use   · Uses Safety Equipment - Seatbelts  The social history was reviewed and updated today  The social history was reviewed and is unchanged  Current Meds   1  Adult Aspirin EC Low Strength 81 MG Oral Tablet Delayed Release; Take 1 tablet daily   as directed Recorded   2  Atorvastatin Calcium 40 MG Oral Tablet; TAKE 1 TABLET BY MOUTH ONCE DAILY    Requested for: 22ZPQ5170; Last Rx:15Jun2017 Ordered   3  Butalbital-APAP-Caffeine -40 MG Oral Capsule; TAKE 1 EVERY EIGHT HOURS   PRN FOR HA;   Therapy: 24YPR0621 to (Evaluate:35Dus5189)  Requested for: 87FWC2005; Last   UA:63AQN3849 Ordered   4  Ibuprofen 800 MG Oral Tablet; TAKE 1 TABLET 3 TIMES DAILY AS NEEDED; Therapy: 51NLT9546 to (03 17 74 30 53)  Requested for: 52EQN5561; Last   MC:44SUP5326 Ordered   5  Levothyroxine Sodium 75 MCG Oral Tablet; TAKE 1 TABLET DAILY; Therapy: 21SNA7233 to (Evaluate:14Gis3488)  Requested for: 92HJX2704; Last   Rx:42Szc7701 Ordered   6  Meclizine HCl - 25 MG Oral Tablet; TAKE 1 TABLET Every 8 hours PRN; Therapy: 87MMM7354 to (Evaluate:92Wex4683)  Requested for: 46XEQ9333; Last   Rx:31Jan2017 Ordered   7  Omega 3 CAPS; Therapy: (Recorded:30Jan2013) to Recorded   8  Timolol Maleate SOLN;   Therapy: (Recorded:25Apr2017) to Recorded    The medication list was reviewed and updated today  Allergies    1  Avelox TABS   2  Demerol TABS   3  Erythromycin Derivatives    4  Shellfish    Immunizations   1 2 3 4    Influenza  15-Nov-2013 23-Oct-2014 27-Oct-2015 08-Dec-2016    Tetanus  2000       Zoster  17-Dec-2009        Vitals  Signs    Temperature: 96 6 F, Tympanic  Heart Rate: 58  Height: 5 ft 3 5 in  Weight: 118 lb 2 oz  BMI Calculated: 20 6  BSA Calculated: 1 55  O2 Saturation: 97, RA    Health Management  Encounter for screening colonoscopy   COLONOSCOPY; every 5 years; Last 62IVM4745; Next Due: 17DVS7265; Active  Health Maintenance   Medicare Annual Wellness Visit; every 1 year; Last 94Kbc8019; Next Due: 60Iax0966;   Active    Signatures Electronically signed by : Gurpreet Warner DO; Aug 30 2017  9:39AM EST                       (Author)

## 2018-01-12 VITALS
HEART RATE: 60 BPM | RESPIRATION RATE: 14 BRPM | DIASTOLIC BLOOD PRESSURE: 64 MMHG | OXYGEN SATURATION: 98 % | WEIGHT: 118 LBS | HEIGHT: 64 IN | BODY MASS INDEX: 20.14 KG/M2 | SYSTOLIC BLOOD PRESSURE: 108 MMHG

## 2018-01-12 VITALS
HEART RATE: 58 BPM | HEIGHT: 64 IN | OXYGEN SATURATION: 97 % | TEMPERATURE: 96.6 F | BODY MASS INDEX: 20.17 KG/M2 | WEIGHT: 118.13 LBS

## 2018-01-12 NOTE — MISCELLANEOUS
Message  Called and spoke with patient regarding her concern for possible increased IOP, with tegretol, it is a rare but possible s/e, she already has glaucoma, so discussed that she not take this  States she has not  She is taking Magnesium and Riboflavin which has been beneficial for headache  She may continue this  If this is not helpful in the future we can try nortriptyline      Robyn Prieto [address] Let[address] DO      Signatures   Electronically signed by : Hilda Woods MD; Oct 18 2017  4:54PM EST                       (Author)

## 2018-01-13 VITALS
DIASTOLIC BLOOD PRESSURE: 74 MMHG | SYSTOLIC BLOOD PRESSURE: 122 MMHG | WEIGHT: 122.38 LBS | BODY MASS INDEX: 20.89 KG/M2 | TEMPERATURE: 95 F | HEIGHT: 64 IN

## 2018-01-13 NOTE — MISCELLANEOUS
Message   Recorded as Task   Date: 01/20/2016 11:16 AM, Created By: Marland Favre   Task Name: Follow Up   Assigned To: [facility]   Regarding Patient: Jas Sogn, Status: In Progress   Comment:    Lis Nur - 20 Jan 2016 11:16 AM     TASK CREATED  S/p LESI on 1/14/16 w/Dr Taryn Munroe in [place]    S/w pt who reports at least 95% improvement in pain  -would like to cancel 2nd LESI scheduled for 2/4/16  -she would like to know if she can get a script for physical therapy  -will call back w/Dr Taryn Munroe recommendations   Daniel Hathaway - 20 Jan 2016 12:23 PM     TASK REPLIED TO: Previously Assigned To [facility],Team  tell her hi from me and its great shes doing well-  PT script in [employer] but she needs approval from opthamologist   Dacia Lynn - 21 Jan 2016 9:29 AM     TASK EDITED    I spoke with pt, advised above  Pt states she is going to wait until after her second surgery before starting PT, she will also get approval   Pt asking for script to be mailed to her home  Pt very thankful for everything  Dacia Lynn - 21 Jan 2016 9:31 AM     TASK EDITED    Script mailed  ****        Active Problems    1  Abnormal loss of weight (783 21) (R63 4)   2  Allergic rhinitis (477 9) (J30 9)   3  Cataracts, bilateral (366 9) (H26 9)   4  Cervicalgia (723 1) (M54 2)   5  Depression with anxiety (300 4) (F41 8)   6  Esophageal reflux (530 81) (K21 9)   7  Headache (784 0) (R51)   8  History of malignant neoplasm of colon (V10 05) (Z85 038)   9  Hyperlipidemia (272 4) (E78 5)   10  Insomnia (780 52) (G47 00)   11  Lumbar disc displacement without myelopathy (722 10) (M51 26)   12  Lumbar radiculopathy (724 4) (M54 16)   13  Migraine headache (346 90) (G43 909)   14  Migraine, hemiplegic, with status migrainosus (346 32) (G43 401)   15  Neurodermatitis (698 3) (L28 0)   16  Osteoporosis (733 00) (M81 0)   17  Pernicious anemia (281 0) (D51 0)    Current Meds   1   Butalbital-APAP-Caffeine -40 MG Oral Capsule; TAKE 1 EVERY EIGHT HOURS   PRN FOR HA;   Therapy: 40GLC3302 to (Evaluate:19Nov2015)  Requested for: 83LFS0891; Last   Rx:20Oct2015 Ordered   2  Famotidine 20 MG Oral Tablet; Therapy: 51SML1017 to Recorded   3  Ibuprofen 800 MG Oral Tablet; 1 PO bid PRN for pain; Therapy: 84KUU3740 to (Glory Patino)  Requested for: 10OHB6784; Last   Rx:53Wjq6645 Ordered   4  Levocetirizine Dihydrochloride 5 MG Oral Tablet; Take 1 tablet daily; Last Rx:99Kgi6198   Ordered   5  LORazepam 0 5 MG Oral Tablet; TAKE 1 TABLET EVERY 12 HOURS AS NEEDED; Therapy: 23NSH1888 to (Evaluate:19Jun2015); Last Rx:20May2015 Ordered   6  MiraLax Oral Powder (Polyethylene Glycol 3350); MIX 17 GM IN 8 OUNCES OF LIQUID   AND DRINK 1 TO 2 TIMES DAILY FOR CONSTIPATION; Therapy: 51UYS6864 to (Evaluate:92Hdu9376); Last Rx:27Oct2015 Ordered   7  Omega 3 CAPS; Therapy: (Recorded:30Jan2013) to Recorded   8  Synthroid 75 MCG Oral Tablet (Levothyroxine Sodium); TAKE 1 TABLET DAILY; Therapy: 21Jun2011 to (Evaluate:19Nix4362)  Requested for: 73SAA8709; Last   Rx:06Jan2016 Ordered   9  Vicodin 5-300 MG Oral Tablet; TAKE ONE TABLET BY MOUTH 2 TIMES A DAY   (OSTEOARTHRITIS); Therapy: 36Rji0879 to (Evaluate:03Oct2014); Last Rx:29Lnj2246 Ordered   10  Voltaren 1 % Transdermal Gel; Therapy: 24SJX7910 to (Last Rx:13Oct2011)  Requested for: 13Oct2011 Ordered    Allergies    1  Avelox TABS   2  Demerol TABS   3  Erythromycin Derivatives    4  Shellfish    Signatures   Electronically signed by :  Leana Aguillon, ; Jan 21 2016  9:31AM EST                       (Author)

## 2018-01-15 VITALS
HEART RATE: 70 BPM | HEIGHT: 64 IN | TEMPERATURE: 96.5 F | BODY MASS INDEX: 19.89 KG/M2 | SYSTOLIC BLOOD PRESSURE: 136 MMHG | WEIGHT: 116.5 LBS | DIASTOLIC BLOOD PRESSURE: 78 MMHG | OXYGEN SATURATION: 98 %

## 2018-01-15 NOTE — RESULT NOTES
Message   benign polyp  colonoscopy in 5 years  Thanks     Verified Results  (1) TISSUE EXAM [id number] 02:49PM Saunders Gosselin     Test Name Result Flag Reference   LAB AP CASE REPORT (Report)     Surgical Pathology Report             Case: [id number]                   Authorizing Provider: Juan Sanchez MD      Collected:      03/18/2016 1449        Ordering Location:   [facility] Code Received:      03/18/2016 1526                    Operating Room                                 Pathologist:      Abiola Boswell MD                              Specimen:  Rectum, polyp, retrieved with cold biopsy forcep   LAB AP FINAL DIAGNOSIS      Rectum, biopsy of:  Hyperplastic polyp  Interpretation performed at [facility]   [address]   LAB AP SURGICAL ADDITIONAL INFORMATION (Report)     These tests were developed and their performance characteristics   determined by [address] [place] [address] or [facility]   Laboratories  They may not be cleared or approved by the [place]  Food and   Drug Administration  The FDA has determined that such clearance or   approval is not necessary  These tests are used for clinical purposes  They should not be regarded as investigational or for research  This   laboratory has been approved by [place] 88, designated as a high-complexity   laboratory and is qualified to perform these tests  CPT code: 33949  LAB AP GROSS DESCRIPTION (Report)     A  The specimen is received in formalin, labeled with the patient's name   and hospital number, and is designated rectum polyp  The specimen   consists of 2 tan soft tissue fragments measuring 0 2 and 0 3 cm  Entirely   submitted  One cassette  Note: The estimated total formalin fixation time based upon information   provided by the submitting clinician and the standard processing schedule   is 71 25 hours      MAC

## 2018-01-16 NOTE — MISCELLANEOUS
Message  Patient called back stating that the magnesium and riboflavin are no longer helping her headaches or allodynia and neuropathic pain in her temporal region  Will have her try baclofen 5 mg qhs x 1 week, then 10 mg qhs     Relayed this to clinical team     Nisha Seo DO      Plan  Allodynia, Cervicogenic headache, Migraine, unspecified, not intractable, without status  migrainosus    · Baclofen 10 MG Oral Tablet;  Take half tab nightly x 1 week, then 1 tab nightly    Signatures   Electronically signed by : Nisha Seo MD; Oct 23 2017  5:25PM EST                       (Author)

## 2018-01-22 VITALS
OXYGEN SATURATION: 90 % | BODY MASS INDEX: 20.53 KG/M2 | HEART RATE: 74 BPM | TEMPERATURE: 96.6 F | WEIGHT: 120.25 LBS | HEIGHT: 64 IN

## 2018-01-22 VITALS — SYSTOLIC BLOOD PRESSURE: 120 MMHG | DIASTOLIC BLOOD PRESSURE: 72 MMHG

## 2018-01-23 NOTE — MISCELLANEOUS
Assessment   1  Atypical chest pain (786 59) (R07 89)  2  Cervicalgia (723 1) (M54 2)  3  Depression with anxiety (300 4) (F41 8)    Plan  Atypical chest pain, Hyperlipidemia    · Follow-up visit in 2 months Evaluation and Treatment  Follow-up  Status: Hold For -  Scheduling  Requested for: 79NUS8602  Ordered; For: Atypical chest pain, Hyperlipidemia; Ordered By: Meri Stephenson    Performed:   Due: 37GMX8045  Health Maintenance    · *VB - Fall Risk Assessment  (Dx Z13 89 Screen for Neurologic Disorder);  Status:Complete;   Done: 12ANY1071 11:18AM  Performed: In Office; DRQ:49AXH9469;SLBMKNZ; For:Health Maintenance; Ordered   By:Areli Prather;   · *VB - Urinary Incontinence Screen (Dx Z13 89 Screen for UI); Status:Complete;   Done:  78WCA1029 11:19AM  Performed: In Office; AWJ:22UIW2048;LGONNZD; For:Health Maintenance; Ordered   By:Areli Prather;  Hyperlipidemia    · (1) LIPID PANEL, FASTING; Status:Active; Requested for:55Utj6973;   Perform:[facility]; FRO:65XCZ8106; Ordered; Zohreh Aguilaring; Ordered   By:Areli Prather;    Discussion/Summary  Discussion Summary:   Resume exercise and cervical exercises  Continue statin and jacek labs in 2 months  Increase water intake  Rto 2 months1    Goals and Barriers: The patient has the current Goals:1  Resume exercise,control sxs1  The patent has the current Barriers:1  None1    Patient's Capacity to Self-Care: Patient is able to Self-Care  Medication SE Review and Pt Understands Tx: Possible side effects of new medications were reviewed with the patient/guardian today1  The treatment plan was reviewed with the patient/guardian  The patient/guardian understands and agrees with the treatment plan1    Self Referrals:   Self Referrals: No       1 Amended By: Meri Stephenson; Feb 26 2017 4:06 PM EST    Chief Complaint  Chief Complaint Free Text Note Form: Pt presents for hosp f/up exam  Pt states she is feeling better although she has some fatigue  Appetite is better and she is watching her diet  She would like a rx of flexeril today  No falls  History of Present Illness  TCM Communication [facility]: The patient is being contacted for follow-up after hospitalization  She was hospitalized at [facility]  The dates of hospitalization: February16, 2017 to February 17, 2017  Diagnosis: chest pain  She was discharged to home  Communication performed and completed by   HPI: Pt had episode of cp while shopping in [place]  Sxs persisted and went to [facility]  Was transferred to [facility] for further care  Had normal stress and cardio workup  No recurrent sxs  No heartburn  No cough or uri sxs  Started on statin and is taking daily  So far tolerating rx  1        1 Amended By: Lisa Evans; Feb 26 2017 4:02 PM EST    Review of Systems  Complete-Female:   Constitutional:1  No fever, no chills, feels well, no tiredness, no recent weight gain or weight loss1   Eyes:1  No complaints of eye pain, no red eyes, no eyesight problems, no discharge, no dry eyes, no itching of eyes1   ENT:1  no complaints of earache, no loss of hearing, no nose bleeds, no nasal discharge, no sore throat, no hoarseness1   Cardiovascular:1  No complaints of slow heart rate, no fast heart rate, no chest pain, no palpitations, no leg claudication, no lower extremity edema1   Respiratory:1  No complaints of shortness of breath, no wheezing, no cough, no SOB on exertion, no orthopnea, no PND1   Gastrointestinal:1  No complaints of abdominal pain, no constipation, no nausea or vomiting, no diarrhea, no bloody stools1   Genitourinary:1  No complaints of dysuria, no incontinence, no pelvic pain, no dysmenorrhea, no vaginal discharge or bleeding1   Musculoskeletal:1  No complaints of arthralgias, no myalgias, no joint swelling or stiffness, no limb pain or swelling1      Integumentary:1  No complaints of skin rash or lesions, no itching, no skin wounds, no breast pain or lump1   Neurological:1  No complaints of headache, no confusion, no convulsions, no numbness, no dizziness or fainting, no tingling, no limb weakness, no difficulty walking1   Psychiatric:1  Not suicidal, no sleep disturbance, no anxiety or depression, no change in personality, no emotional problems1   Endocrine:1  No complaints of proptosis, no hot flashes, no muscle weakness, no deepening of the voice, no feelings of weakness1   Hematologic/Lymphatic:1  No complaints of swollen glands, no swollen glands in the neck, does not bleed easily, does not bruise easily1   Preventive Quality 65 Older:   Preventive Quality 65 and Older: Falls Risk: The patient fell 0 times in the past 12 months  The patient is currently asymptomatic Symptoms Include:  Associated symptoms:  No associated symptoms are reported  The patient currently has no urinary incontinence symptoms  ROS Reviewed:   ROS reviewed  1        1 Amended By: Miah Mathis; Feb 26 2017 4:03 PM EST    Active Problems    1  Abnormal loss of weight (783 21) (R63 4)  2  Advance directive discussed with patient (V65 49) (Z71 89)  3  Cataracts, bilateral (366 9) (H26 9)  4  Cervicalgia (723 1) (M54 2)  5  Depression with anxiety (300 4) (F41 8)  6  Encounter for other general examination (V70 8) (Z00 8)  7  Encounter for screening colonoscopy (V76 51) (Z12 11)  8  Encounter for screening mammogram for malignant neoplasm of breast (V76 12)   (Z12 31)  9  Esophageal reflux (530 81) (K21 9)  10  History of malignant neoplasm of colon (V10 05) (Z85 038)  11  Hyperlipidemia (272 4) (E78 5)  12  Hypothyroidism (244 9) (E03 9)  13  Lumbar disc displacement without myelopathy (722 10) (M51 26)  14  Lumbar radiculopathy (724 4) (M54 16)  15  Migraine, unspecified, not intractable, without status migrainosus (346 90) (G43 909)  16  Need for influenza vaccination (V04 81) (Z23)  17  Osteoporosis (733 00) (M81 0)  18   Pernicious anemia (281 0) (D51 0)    Neurodermatitis (698 3) (L28 0)       Insomnia (780 52) (G47 00)       Migraine, hemiplegic, with status migrainosus (346 32) (G43 401)       Chronic sinusitis, unspecified location (473 9) (J32 9)       Vertigo (780 4) (R42)          Past Medical History   1  History of Allergic rhinitis (477 9) (J30 9)  2  History of Allergic rhinitis (477 9) (J30 9)  3  History of Backache (724 5) (M54 9)  4  History of Chronic constipation (564 00) (K59 09)  5  History of Encounter for screening colonoscopy (V76 51) (Z12 11)  6  History of Encounter for screening mammogram for malignant neoplasm of breast   (V76 12) (Z12 31)  7  History of Headache (784 0) (R51)  8  History of hypercholesterolemia (V12 29) (Z86 39)  9  History of malignant neoplasm of colon (V10 05) (Z85 038)  10  History of thyroid disease (V12 29) (Z86 39)  11  History of Lower back pain (724 2) (M54 5)  12  History of Need for prophylactic vaccination and inoculation against influenza (V04 81)    (Z23)  13  History of Thrush, oral (112 0) (B37 0)  14  History of Visit for screening mammogram (V76 12) (Z12 31)    Surgical History   1  History of Appendectomy  2  History of Breast Surgery Puncture Aspiration Of Cyst Right Breast  3  History of Hemicolectomy  4  History of Hysterectomy  5  History of Mohs Micrographic Surgery Head  6  History of Oophorectomy  7  History of Partial Colectomy  Surgical History Reviewed: The surgical history was reviewed and updated today  Family History  Mother   1  Family history of Colon Cancer (V16 0)  2  Family history of Lung Cancer (V16 1)  3  Family history of Stroke Syndrome (V17 1)  Father   4  Family history of Esophageal Cancer (V16 0)  Family History   5  Family history of Breast Cancer (V16 3)  6  Family history of Coronary Artery Disease (V17 49)  Family History Reviewed: The family history was reviewed and updated today         Social History    · Always uses sunscreen   · Being A Social Drinker   · Caffeine use (V49 89) (F15 90)   · Dental care, regularly   · Lives independently with spouse   · Never A Smoker   · No drug use   · Uses Safety Equipment - Seatbelts  Social History Reviewed: The social history was reviewed and updated today  The social history was reviewed and is unchanged  Current Meds  1  Amoxicillin 500 MG Oral Capsule; Take 1 TID; Therapy: 28PRT0348 to (Evaluate:96Wzj9153); Last Rx:82Ppk5370 Ordered  2  Butalbital-APAP-Caffeine -40 MG Oral Capsule; TAKE 1 EVERY EIGHT HOURS   PRN FOR HA;   Therapy: 03OXX9510 to (Evaluate:19Nov2015)  Requested for: 46GJM4430; Last   Rx:20Oct2015 Ordered  3  Levocetirizine Dihydrochloride 5 MG Oral Tablet; Take 1 tablet daily; Last Rx:02Feu0008   Ordered  4  Levothyroxine Sodium 75 MCG Oral Tablet; TAKE 1 TABLET DAILY; Therapy: 24HKB2306 to (Evaluate:15Szr4050)  Requested for: 46OZM0613; Last   Rx:57Civ7586 Ordered  5  LORazepam 0 5 MG Oral Tablet; TAKE 1 TABLET EVERY 12 HOURS AS NEEDED; Therapy: 94LKF3811 to (Evaluate:19Jun2015); Last Rx:31Ovi1971 Ordered  6  Meclizine HCl - 25 MG Oral Tablet; TAKE 1 TABLET Every 8 hours PRN; Therapy: 00ODA5725 to (Evaluate:32Ahe8925)  Requested for: 67IVL9252; Last   Rx:31Jan2017 Ordered  7  MiraLax Oral Powder; MIX 17 GM IN 8 OUNCES OF LIQUID AND DRINK 1 TO 2 TIMES   DAILY FOR CONSTIPATION; Therapy: 79WGX5750 to (Evaluate:49Ous4983); Last Rx:27Oct2015 Ordered  8  Omega 3 CAPS; Therapy: (Recorded:30Jan2013) to Recorded  9  Synthroid 75 MCG Oral Tablet; TAKE 1 TABLET DAILY; Therapy: 21Jun2011 to (Evaluate:35Khm4550)  Requested for: 60UEK4344; Last   YO:32LVF5254 Ordered  Medication List Reviewed: The medication list was reviewed and updated today  Allergies   1  Avelox TABS  2  Demerol TABS  3  Erythromycin Derivatives   4   Shellfish    Vitals  Signs   Recorded: 26Feb2017 63:83ZZ    Systolic: 792 1    Diastolic: 72 1   Recorded: 39TWW2684 11:10AM    Temperature: 96 6 F 1    Heart Rate: 74 1    Height: 5 ft 3 5 in 1    Weight: 120 lb 4 00 oz 1    BMI Calculated: 20 97 1    BSA Calculated: 1 56 1    O2 Saturation: 90 1      1 Amended By: Trudy Rojas; Feb 26 2017 4:03 PM EST    Physical Exam    Constitutional1    General appearance: No acute distress, well appearing and well nourished  1    Eyes1    Conjunctiva and lids: No swelling, erythema or discharge  1    Pupils and irises: Equal, round and reactive to light  1    Ears, Nose, Mouth, and Throat1    External inspection of ears and nose: Normal 1    Otoscopic examination: Tympanic membranes translucent with normal light reflex  Canals patent without erythema  1    Nasal mucosa, septum, and turbinates: Normal without edema or erythema  1    Oropharynx: Normal with no erythema, edema, exudate or lesions  1    Pulmonary1    Respiratory effort: No increased work of breathing or signs of respiratory distress  1    Auscultation of lungs: Clear to auscultation  1    Cardiovascular1    Palpation of heart: Normal PMI, no thrills  1    Auscultation of heart: Normal rate and rhythm, normal S1 and S2, without murmurs  1    Examination of extremities for edema and/or varicosities: Normal 1    Carotid pulses: Normal 1    Abdomen1    Abdomen: Non-tender, no masses  1    Liver and spleen: No hepatomegaly or splenomegaly  1    Lymphatic1    Palpation of lymph nodes in neck: No lymphadenopathy  1    Musculoskeletal1    Gait and station: Normal 1    Digits and nails: Normal without clubbing or cyanosis  1    Inspection/palpation of joints, bones, and muscles: Normal 1    Skin1    Skin and subcutaneous tissue: Normal without rashes or lesions  1    Neurologic1    Cranial nerves: Cranial nerves 2-12 intact  1    Reflexes: 2+ and symmetric  1    Sensation: No sensory loss  1    Psychiatric1    Orientation to person, place, and time: Normal 1    Mood and affect: Normal 1          1 Amended By: Trudy Rojas; Feb 26 2017 4:04 PM EST    Results/Data  Falls Risk Assessment (Dx Z13 89 Screen for Neurologic Disorder) 16ANK2726 11:19AM User, Ahs     Test Name Result Flag Reference   Falls Risk      No falls in the past year     *VB - Urinary Incontinence Screen (Dx Z13 89 Screen for UI) 43EKR9196 11:19AM Jared Bank     Test Name Result Flag Reference   Urinary Incontinence Assessment 14NWH1554       *VB - Fall Risk Assessment  (Dx Z13 89 Screen for Neurologic Disorder) 61SOL4926 11:18AM Jared Bank     Test Name Result Flag Reference   Falls Risk      No falls in the past year       Health Management  Encounter for screening colonoscopy   COLONOSCOPY; every 5 years; Last 81TWQ0025; Next Due: 81RPZ4530; Active  Health Maintenance   Medicare Annual Wellness Visit; every 1 year; Last 41Qtf7294; Next Due: 51Lzx9270;  Active    Signatures  mca    Electronically signed by : Lyn Chester DO; Feb 24 2017 12:06PM EST                       (Author)    Electronically signed by : Lyn Chester DO; Feb 26 2017  4:06PM EST                       (Author)

## 2018-01-30 NOTE — PROGRESS NOTES
Assessment   1  Encounter for preventive health examination (V70 0) (Z00 00)    Plan  Cervicalgia    · Start: Voltaren 1 % Transdermal Gel (Diclofenac Sodium); Apply to affected area daily  Cervicalgia, Headache    · * XR SPINE CERVICAL 2 OR 3 VIEW; Status:Active; Requested for:[id number];   Cervicalgia, Insomnia    · Follow-up visit in 6 months Evaluation and Treatment  Follow-up  Status: Hold For -  Scheduling  Requested for: [id number]    Discussion/Summary    Xray c spine and recommend PT for strengthening exercises1      Impression:1  Initial Annual Wellness Visit1 , with preventive exam as well as age and risk appropriate counseling completed1   Cardiovascular screening and counselin  screening is current1   Diabetes screening and counselin  screening is current1   Colorectal cancer screening and counseling: screening is current1  and counseling was given on ways to eat a high fiber diet1   Breast cancer screening and counselin  screening is current1   Cervical cancer screening and counselin  screening is current1   Osteoporosis screening and counselin  screening is current1   Abdominal aortic aneurysm screening and counselin  screening not indicated1   Glaucoma screening and counselin  screening is current1   HIV screening and counselin  screening not indicated1   Immunizations:1  Influenza vaccine is up to date this year1 , influenza vaccination is recommended annually1 , pneumococcal vaccination status is unknown1 , hepatitis B vaccination series is not indicated at this time due to the patient's low risk of dorita the disease1 , Zostavax vaccination up to date1 , Td vaccination up to date1  and Tdap vaccination status is unknown1   Advance Directive Plannin  not complete1 , paperwork and instructions were given to the patient1 , she was encouraged to follow-up with me to discuss her questions and/or decisions1    Advice and education were given regarding1 increasing physical activity1   Patient Discussion:1  plan discussed with the patient1 , follow-up visit needed in 6 month1   Self Referrals: No       1 Amended By: David Carmichael; 2016 7:47 PM EST    Chief Complaint  Pt presents for Well exam  Pt feels well and without complaints at this time  No refills needed at this time  Pt appetite good and she does drink water daily  Pt states she doesn't sleep well at night, this isn't new for her  History of Present Illness  HPI: Pt doing ok  Still has headaches but has not started topamax yet  1    Welcome to [employer] and Wellness Visits: The patient is being seen for the  initial annual wellness visit1  1   Medicare Screening and Risk Factors1    Hospitalizations:1  no previous hospitalizations1   Once per lifetime medicare screening tests:1  ECG1  and AAA screening US has not yet been done1   Medicare Screening Tests Risk Questions   Abdominal aortic aneurysm risk assessment:1  none indicated1   Osteoporosis risk assessment:1  caucasian1 , female gender1  and over 48years of age2   HIV risk assessment:1  none indicated1   Drug and Alcohol Use:1  The patient  has never smoked cigarettes1  1   The patient reports  occasional alcohol use1  1   Alcohol concern: 1   The patient has no concerns about alcohol abuse1   She  has never used illicit drugs1  1   Diet and Physical Activity:1  Current diet includes  well balanced meals1  1   She  exercises 2 times per week1  1   Exercise:1  stretching1  30 minutes per day1   Mood Disorder and Cognitive Impairment Screenin    Depression screening1   Negative for symptoms1   She denies feeling down, depressed, or hopeless over the past two weeks1   She denies feeling little interest or pleasure in doing things over the past two weeks1      Cognitive impairment screenin  denies difficulty learning/retaining new information1 , denies difficulty handling complex tasks1 , denies difficulty with reasoning1 , denies difficulty with spatial ability and orientation1 , denies difficulty with language1  and denies difficulty with behavior1   Functional Ability/Level of Safety:1  Hearing is1  normal bilaterally1  and a hearing aid is not used1   She denies hearing difficulties1  The patient is currently1  able to do activities of daily living without limitations1 , able to do instrumental activities of daily living without limitations1 , able to participate in social activities without limitations1  and able to drive without limitations1   Activities of daily living details:1  does not need help using the phone1 , no transportation help needed1 , does not need help shopping1 , no meal preparation help needed1 , does not need help doing housework1 , does not need help doing laundry1 , does not need help managing medications1  and does not need help managing money1   Fall risk factors: 1  The patient fell 0 times in the past 12 months  1   Home safety risk factors: 1  no unfamiliar surroundings1 , no loose rugs1 , no poor household lighting1 , no uneven floors1 , no household clutter1 , grab bars in the bathroom1  and handrails on the stairs1   Advance Directives:1  Advance directives: 1  no living will1 , no durable power of  for health care directives1  and no advance directives1   End of life decisions were reviewed with the patient1  and I agree with the patient's decisions1   Co-Managers and Medical Equipment/Suppliers: See Patient Care Team   Preventive Quality Program 65 and Older: Falls Risk:1  The patient fell 0 times in the past 12 months  1   The patient is currently asymptomatic1  Symptoms Include: 1  Associated symptoms: 1  No associated symptoms are reported1      The patient currently has no urinary incontinence symptoms1          1 Amended By: David Carmichael; Jul 20 2016 7:43 PM EST    Patient Care Team    Care Team Member Role Specialty Office Number   Jeff Vergara DO Specialist Pain Management [phone]   Kel Zuleta, [address] [facility] Specialist Pain Management [phone]   Jeremiah Leon MD Specialist Gastroenterology Adult [phone]   Misti Kirby DO  Pain Management [phone]   Kiol Jon [facility]  Internal Medicine [phone]     Review of Systems  Over the past 2 weeks, how often have you been bothered by the following problems? 1 ) Little interest or pleasure in doing things? 1  Not at all1     2 ) Feeling down, depressed or hopeless? 1  Not at all1     3 ) Trouble falling asleep or sleeping too much? 1  Not at all1     4 ) Feeling tired or having little energy? 1  Not at all1     5 ) Poor appetite or overeating? 1  Not at all1     6 ) Feeling bad about yourself, or that you are a failure, or have let yourself or your family down? 1  Not at all1     7 ) Trouble concentrating on things, such as reading a newspaper or watching television? 1  Not at all1     8 ) Moving or speaking so slowly that other people could have noticed, or the opposite, moving or speaking faster than usual?1  Not at all1   How difficult have these problems made it for you to do your work, take care of things at home, or get along with people? 1  Not at all1   Score 01        1 Amended By: Kilo Jon; Jul 20 2016 7:44 PM EST    Active Problems   1  Abnormal loss of weight (783 21) (R63 4)  2  Cataracts, bilateral (366 9) (H26 9)  3  Cervicalgia (723 1) (M54 2)  4  Depression with anxiety (300 4) (F41 8)  5  Encounter for other general examination (V70 8) (Z00 8)  6  Encounter for screening colonoscopy (V76 51) (Z12 11)  7  Esophageal reflux (530 81) (K21 9)  8  Headache (784 0) (R51)  9  History of malignant neoplasm of colon (V10 05) (Z85 038)  10  Hyperlipidemia (272 4) (E78 5)  11  Insomnia (780 52) (G47 00)  12  Lumbar disc displacement without myelopathy (722 10) (M51 26)  13  Lumbar radiculopathy (724 4) (M54 16)  14  Migraine headache (346 90) (G45 317)  15   Migraine, hemiplegic, with status migrainosus (346 32) (G43 401)  16  Neurodermatitis (698 3) (L28 0)  17  Osteoporosis (733 00) (M81 0)  18  Pernicious anemia (281 0) (D51 0)    Past Medical History    · History of Allergic rhinitis (477 9) (J30 9)   · History of Allergic rhinitis (477 9) (J30 9)   · History of Backache (724 5) (M54 9)   · History of Chronic constipation (564 00) (K59 09)   · History of Chronic sinusitis, unspecified location (473 9) (J32 9)   · History of Encounter for screening colonoscopy (V76 51) (Z12 11)   · History of Encounter for screening mammogram for malignant neoplasm of breast  (V76 12) (Z12 31)   · History of Encounter for screening mammogram for malignant neoplasm of breast  (V76 12) (Z12 31)   · History of hypercholesterolemia (V12 29) (Z86 39)   · History of hypothyroidism (V12 29) (Z86 39)   · History of malignant neoplasm of colon (V10 05) (Z85 038)   · History of thyroid disease (V12 29) (Z86 39)   · History of Lower back pain (724 2) (M54 5)   · History of Need for prophylactic vaccination and inoculation against influenza (V04 81)  (Z23)   · History of Thrush, oral (112 0) (B37 0)   · History of Visit for screening mammogram (V76 12) (Z12 31)    The active problems and past medical history were reviewed and updated today  Surgical History    · History of Appendectomy   · History of Breast Surgery Puncture Aspiration Of Cyst Right Breast   · History of Hemicolectomy   · History of Hysterectomy   · History of Mohs Micrographic Surgery Head   · History of Oophorectomy   · History of Partial Colectomy    The surgical history was reviewed and updated today         Family History  Mother    · Family history of Colon Cancer (V16 0)   · Family history of Lung Cancer (V16 1)   · Family history of Stroke Syndrome (V17 1)  Father    · Family history of Esophageal Cancer (V16 0)  Family History    · Family history of Breast Cancer (V16 3)   · Family history of Coronary Artery Disease (V17 49)    The family history was reviewed and updated today  Social History    · Being A Social Drinker   · Never A Smoker   · Uses Safety Equipment - Seatbelts  The social history was reviewed and updated today  The social history was reviewed and is unchanged  Current Meds  1  Butalbital-APAP-Caffeine -40 MG Oral Capsule; TAKE 1 EVERY EIGHT HOURS   PRN FOR HA;   Therapy: 00ZXC3919 to (Evaluate:19Nov2015)  Requested for: 49DAU4515; Last   Rx:20Oct2015 Ordered  2  Levocetirizine Dihydrochloride 5 MG Oral Tablet; Take 1 tablet daily; Last Rx:83Pvz0093   Ordered  3  LORazepam 0 5 MG Oral Tablet; TAKE 1 TABLET EVERY 12 HOURS AS NEEDED; Therapy: 47YFO2194 to (Evaluate:19Jun2015); Last Rx:[id number] Ordered  4  MiraLax Oral Powder; MIX 17 GM IN 8 OUNCES OF LIQUID AND DRINK 1 TO 2 TIMES   DAILY FOR CONSTIPATION; Therapy: 31TRW6132 to (Evaluate:38Oow4865); Last Rx:27Oct2015 Ordered  5  Omega 3 CAPS; Therapy: (Recorded:30Jan2013) to Recorded  6  Synthroid 75 MCG Oral Tablet; TAKE 1 TABLET DAILY; Therapy: 21Jun2011 to (Evaluate:22Nsr4967)  Requested for: 28ULI9669; Last   Rx:06Jan2016 Ordered  7  Topiramate 50 MG Oral Tablet; Take 25 mg daily for one week then after one week 50   mg daily; Therapy: 96JZG7597 to (Evaluate:17Zft1279)  Requested for: 32HAM2026; Last   Rx:07Jun2016 Ordered  8  Vicodin 5-300 MG Oral Tablet; TAKE ONE TABLET BY MOUTH 2 TIMES A DAY   (OSTEOARTHRITIS); Therapy: 13Jkv7474 to (Evaluate:03Oct2014); Last Rx:62Nsx9359 Ordered  9  Voltaren 1 % Transdermal Gel; Therapy: 78DZV4173 to (Last Rx:13Oct2011)  Requested for: 13Oct2011 Ordered    The medication list was reviewed and updated today  Allergies   1  Avelox TABS  2  Demerol TABS  3  Erythromycin Derivatives   4   Shellfish    Immunizations   1 2 3    Influenza  15-Nov-2013 23-Oct-2014 27-Oct-2015    Tetanus  2000      Zoster  17-Dec-2009       Vitals  Signs    Systolic: 8200   Diastolic: 500    Temperature: 96 6 F  Height: 5 ft 3 5 in  Weight: 123 lb 2 08 oz  BMI Calculated: 21 47  BSA Calculated: 1 58     1 Amended By: Jeovany Lambert; Jul 20 2016 7:44 PM EST    Physical Exam    Constitutional1    General appearance: No acute distress, well appearing and well nourished  1    Head and Face   Head and face: Normal 1    Palpation of the face and sinuses: No sinus tenderness  1    Eyes1    Conjunctiva and lids: No swelling, erythema or discharge  1    Pupils and irises: Equal, round, reactive to light  1    Ophthalmoscopic examination: Normal fundi and optic discs  1    Ears, Nose, Mouth, and Throat1    External inspection of ears and nose: Normal 1    Otoscopic examination: Tympanic membranes translucent with normal light reflex  Canals patent without erythema  1    Hearing: Normal 1    Nasal mucosa, septum, and turbinates: Normal without edema or erythema  1    Lips, teeth, and gums: Normal, good dentition  1    Oropharynx: Normal with no erythema, edema, exudate or lesions  1    Neck1    Neck: Supple, symmetric, trachea midline, no masses  1    Thyroid: Normal, no thyromegaly  1    Pulmonary1    Respiratory effort: No increased work of breathing or signs of respiratory distress  1    Percussion of chest: Normal 1    Palpation of chest: Normal 1    Auscultation of lungs: Clear to auscultation  1    Cardiovascular1    Palpation of heart: Normal PMI, no thrills  1    Auscultation of heart: Normal rate and rhythm, normal S1 and S2, no murmurs  1    Carotid pulses: 2+ bilaterally  1    Abdominal aorta: Normal 1    Femoral pulses: 2+ bilaterally  1    Pedal pulses: 2+ bilaterally  1    Abdomen1    Abdomen: Non-tender, no masses  1    Liver and spleen: No hepatomegaly or splenomegaly  1    Lymphatic1    Palpation of lymph nodes in neck: No lymphadenopathy  1    Palpation of lymph nodes in axillae: No lymphadenopathy  1    Musculoskeletal1    Gait and station: Normal 1    Digits and nails: Normal without clubbing or cyanosis  1    Joints, bones, and muscles: Normal 1    Range of motion: Abnormal  1  Decrease right neck1   Stability: Normal 1    Muscle strength/tone: Normal 1    Skin1    Skin and subcutaneous tissue: Normal without rashes or lesions  1    Palpation of skin and subcutaneous tissue: Normal turgor  1    Neurologic1    Cranial nerves: Cranial nerves II-XII intact  1    Cortical function: Normal mental status  1    Reflexes: 2+ and symmetric  1    Sensation: No sensory loss  1    Coordination: Normal finger to nose and heel to shin  1    Psychiatric1    Judgment and insight: Normal 1    Orientation to person, place, and time: Normal 1    Recent and remote memory: Intact  1    Mood and affect: Normal 1        1 Amended By: Florence Harman; Jul 20 2016 7:45 PM EST    Health Management  Encounter for screening colonoscopy   COLONOSCOPY; every 5 years; Last 05BQX2307; Next Due: 48XFO0320;  Active    Signatures   Electronically signed by : Laci Vu DO; Jul 20 2016  7:47PM EST                       (Author)

## 2018-02-13 ENCOUNTER — TELEPHONE (OUTPATIENT)
Dept: INTERNAL MEDICINE CLINIC | Facility: CLINIC | Age: 73
End: 2018-02-13

## 2018-02-13 RX ORDER — MECLIZINE HYDROCHLORIDE 25 MG/1
1 TABLET ORAL EVERY 8 HOURS PRN
COMMUNITY
Start: 2017-01-31 | End: 2018-04-23 | Stop reason: ALTCHOICE

## 2018-02-13 RX ORDER — BRIMONIDINE TARTRATE, TIMOLOL MALEATE 2; 5 MG/ML; MG/ML
SOLUTION/ DROPS OPHTHALMIC
COMMUNITY
End: 2019-02-05

## 2018-02-13 RX ORDER — BACLOFEN 10 MG/1
TABLET ORAL
COMMUNITY
Start: 2017-10-23 | End: 2018-04-23 | Stop reason: ALTCHOICE

## 2018-02-13 RX ORDER — CYCLOBENZAPRINE HCL 10 MG
TABLET ORAL EVERY 12 HOURS PRN
COMMUNITY
Start: 2017-12-18 | End: 2019-01-08 | Stop reason: ALTCHOICE

## 2018-02-13 RX ORDER — VENLAFAXINE HYDROCHLORIDE 37.5 MG/1
1 CAPSULE, EXTENDED RELEASE ORAL DAILY
COMMUNITY
Start: 2017-12-12 | End: 2018-04-23 | Stop reason: ALTCHOICE

## 2018-02-13 RX ORDER — BUTALBITAL, ACETAMINOPHEN AND CAFFEINE 300; 40; 50 MG/1; MG/1; MG/1
CAPSULE ORAL AS NEEDED
COMMUNITY
Start: 2014-06-19 | End: 2022-03-29 | Stop reason: ALTCHOICE

## 2018-02-13 RX ORDER — IBUPROFEN 800 MG/1
1 TABLET ORAL 3 TIMES DAILY PRN
COMMUNITY
Start: 2017-05-01 | End: 2018-04-23 | Stop reason: ALTCHOICE

## 2018-02-13 RX ORDER — NORTRIPTYLINE HYDROCHLORIDE 10 MG/1
CAPSULE ORAL
COMMUNITY
Start: 2017-11-27 | End: 2018-04-23 | Stop reason: ALTCHOICE

## 2018-02-13 RX ORDER — ROSUVASTATIN CALCIUM 5 MG/1
1 TABLET, COATED ORAL DAILY
COMMUNITY
Start: 2017-08-30 | End: 2018-04-23 | Stop reason: ALTCHOICE

## 2018-02-16 ENCOUNTER — OFFICE VISIT (OUTPATIENT)
Dept: INTERNAL MEDICINE CLINIC | Facility: CLINIC | Age: 73
End: 2018-02-16
Payer: MEDICARE

## 2018-02-16 VITALS
TEMPERATURE: 96.2 F | SYSTOLIC BLOOD PRESSURE: 124 MMHG | HEIGHT: 63 IN | DIASTOLIC BLOOD PRESSURE: 70 MMHG | HEART RATE: 58 BPM | WEIGHT: 125.25 LBS | BODY MASS INDEX: 22.19 KG/M2 | OXYGEN SATURATION: 98 %

## 2018-02-16 DIAGNOSIS — B37.0 ORAL THRUSH: Primary | ICD-10-CM

## 2018-02-16 DIAGNOSIS — Z23 NEED FOR PNEUMOCOCCAL VACCINATION: ICD-10-CM

## 2018-02-16 DIAGNOSIS — R19.7 DIARRHEA, UNSPECIFIED TYPE: ICD-10-CM

## 2018-02-16 DIAGNOSIS — Z12.39 SCREENING FOR MALIGNANT NEOPLASM OF BREAST: ICD-10-CM

## 2018-02-16 PROBLEM — R00.1 BRADYCARDIA: Status: ACTIVE | Noted: 2017-08-30

## 2018-02-16 PROBLEM — R07.89 ATYPICAL CHEST PAIN: Status: ACTIVE | Noted: 2017-02-24

## 2018-02-16 PROBLEM — K58.9 IRRITABLE BOWEL SYNDROME: Status: ACTIVE | Noted: 2017-11-10

## 2018-02-16 PROBLEM — G44.86 CERVICOGENIC HEADACHE: Status: ACTIVE | Noted: 2017-10-17

## 2018-02-16 PROBLEM — R20.8 ALLODYNIA: Status: ACTIVE | Noted: 2017-10-17

## 2018-02-16 PROBLEM — H40.9 GLAUCOMA: Status: ACTIVE | Noted: 2017-04-25

## 2018-02-16 PROCEDURE — 99214 OFFICE O/P EST MOD 30 MIN: CPT | Performed by: INTERNAL MEDICINE

## 2018-02-16 RX ORDER — ASPIRIN 81 MG/1
81 TABLET ORAL
COMMUNITY
Start: 2017-02-17 | End: 2018-04-23 | Stop reason: ALTCHOICE

## 2018-02-16 RX ORDER — CLOTRIMAZOLE 10 MG/1
10 LOZENGE ORAL; TOPICAL
COMMUNITY
End: 2018-03-15 | Stop reason: SDUPTHER

## 2018-02-16 RX ORDER — ATORVASTATIN CALCIUM 40 MG/1
40 TABLET, FILM COATED ORAL
COMMUNITY
Start: 2017-02-17 | End: 2018-04-23 | Stop reason: ALTCHOICE

## 2018-02-16 RX ORDER — BRINZOLAMIDE 1 %
SUSPENSION, DROPS(FINAL DOSAGE FORM)(ML) OPHTHALMIC (EYE)
COMMUNITY
Start: 2018-02-08

## 2018-02-16 NOTE — PATIENT INSTRUCTIONS
Oral Candidiasis   AMBULATORY CARE:   Oral candidiasis , or thrush, is a fungal infection that affects the inside of your mouth  Seek care immediately if:   · You have trouble swallowing and your jaw and neck are stiff  · You are dizzy, thirsty, or have a dry mouth  · You are urinating little or not at all  · You cannot eat or drink because of the pain  Contact your healthcare provider if:   · You have a fever  · You have nausea, vomiting, or diarrhea  · Your signs and symptoms get worse, even after treatment  · You have questions or concerns about your condition or care  Common symptoms include the following:   · White or whitish-yellow patches in the mouth that look like milk curds    · Redness or bleeding under the patches    · Sore and painful mouth, with cracking or tearing on the corners    · Bright red tongue that may feel like it is burning    · Trouble swallowing and tasting    · Swelling under dentures  Treatment for oral candidiasis  includes antifungal medicine that helps kill the fungus that caused your oral candidiasis  This medicine may be a pill or a solution that you gargle  Remove dentures before you gargle  Prevent oral candidiasis:  Brush your teeth, gums, and tongue after you eat and before you go to sleep  Use a toothbrush with soft bristles  See your dentist for regular exams  Remove your dentures when you sleep, or at least 6 hours each day  Clean your dentures and soak them in denture   Let them air dry after soaking  Follow up with your healthcare provider as directed:  Write down your questions so you remember to ask them during your visits  © 2017 [employer] Information is for End User's use only and may not be sold, redistributed or otherwise used for commercial purposes  All illustrations and images included in CareNotes® are the copyrighted property of [employer]  or [employer]    The above information is an educational aid only  It is not intended as medical advice for individual conditions or treatments  Talk to your doctor, nurse or pharmacist before following any medical regimen to see if it is safe and effective for you

## 2018-02-16 NOTE — PROGRESS NOTES
Assessment/Plan:     Diagnoses and all orders for this visit:    Oral thrush  -     Basic metabolic panel; Future  -     Hemoglobin A1c; Future  Patient has noted some improvemwent on a few days of trouches so will continue that course for 14 days  If sxs fail to resolve would use oral diflucan and refer to GI for likely egd    Need for pneumococcal vaccination  Pt will consider Prevnar at later date since not feeling well today - has routine appt in April    Diarrhea, unspecified type  -     White Blood Cells, Stool by Gram Stain; Future  -     Clostridium difficile toxin by PCR; Future  Patient will submit stool specimen if sxs fail to resolve with the trouches as prescribed  She continues on probiotic    Other orders  -     baclofen 10 mg tablet; Take by mouth  -     Butalbital-APAP-Caffeine -40 MG CAPS; Take by mouth  -     brimonidine-timolol (COMBIGAN) 0 2-0 5 %; Apply to eye  -     cyclobenzaprine (FLEXERIL) 10 mg tablet; Take by mouth every 12 (twelve) hours as needed  -     ibuprofen (MOTRIN) 800 mg tablet; Take 1 tablet by mouth 3 (three) times a day as needed  -     meclizine (ANTIVERT) 25 mg tablet; Take 1 tablet by mouth every 8 (eight) hours as needed  -     nortriptyline (PAMELOR) 10 mg capsule; Take by mouth  -     rosuvastatin (CRESTOR) 5 mg tablet; Take 1 tablet by mouth daily  -     venlafaxine (EFFEXOR-XR) 37 5 mg 24 hr capsule; Take 1 capsule by mouth Daily  -     Cancel: Mammo screening bilateral w 3d & cad; Future  -     Cancel: PNEUMOCOCCAL CONJUGATE VACCINE 13-VALENT GREATER THAN 6 MONTHS  -     aspirin (ASPIR-81) 81 mg EC tablet; Take 81 mg by mouth  -     atorvastatin (LIPITOR) 40 mg tablet; Take 40 mg by mouth  -     AZOPT 1 % ophthalmic suspension;   -     clotrimazole (MYCELEX) 10 mg gus; Take 10 mg by mouth 5 (five) times a day       Patient ID: Rhoda Travis is a 67 y o  female      HPI   Patient has had irritation of mouth,throat and trouble swallowing for a few days to weeks  She also has looser stools  She was with her daughter who is a physician and dx earlier this week with thrush and is on mycelex trouches for 3 days  Her oral sxs have gotten a bit better and she no longer has trouble swallowing  She has no n/v but her stools have been loose for awhile and she is unsure if it is all related  Her appetite is unchanged  No fever,chills  No recent antibiotics but has been at her daughters and sons for visits in recent weeks    The following portions of the patient's history were reviewed and updated as appropriate: Allergies   Allergen Reactions    Alprazolam     Avelox [Moxifloxacin]     Demerol [Meperidine]     Erythromycin     Shellfish-Derived Products      Family History   Problem Relation Age of Onset    Coronary artery disease Mother     Throat cancer Father      Social History     Social History    Marital status: /Civil Union     Spouse name: N/A    Number of children: N/A    Years of education: N/A     Occupational History    Not on file  Social History Main Topics    Smoking status: Never Smoker    Smokeless tobacco: Never Used    Alcohol use Yes      Comment: social    Drug use: No    Sexual activity: Not on file     Other Topics Concern    Not on file     Social History Narrative    No narrative on file     Allergies   Allergen Reactions    Alprazolam     Avelox [Moxifloxacin]     Demerol [Meperidine]     Erythromycin     Shellfish-Derived Products      Review of Systems   Constitutional: Negative for activity change, appetite change, chills, diaphoresis, fatigue and unexpected weight change  HENT: Positive for congestion, postnasal drip and trouble swallowing  Eyes: Positive for visual disturbance  Eye pressures have improved per patient but has visual deficit mojgan when tired   Respiratory: Negative  Cardiovascular: Negative  Gastrointestinal: Positive for diarrhea   Negative for abdominal distention, abdominal pain, blood in stool, constipation, nausea and rectal pain  Endocrine: Positive for cold intolerance  Genitourinary: Negative  Negative for dysuria, hematuria and pelvic pain  Musculoskeletal: Positive for myalgias  Skin: Negative  Allergic/Immunologic: Negative  Neurological: Positive for numbness  Negative for dizziness, tremors, syncope, facial asymmetry, speech difficulty, weakness, light-headedness and headaches  Hematological: Negative for adenopathy  Psychiatric/Behavioral: Negative  no focal deficits  Nontoxic appearing    Vitals:    02/16/18 0945   Pulse: 58   Temp: (!) 96 2 °F (35 7 °C)   SpO2: 98%   124/70     Physical Exam   Constitutional: She is oriented to person, place, and time  She appears well-developed  No distress  Patient concerned appearing due to recent sxs   HENT:   Head: Normocephalic and atraumatic  Right Ear: External ear normal    Left Ear: External ear normal    Nose: Nose normal    Mouth/Throat: Oropharyngeal exudate present  [address] noted on tongue and oropharynx with white plaques   Eyes: Conjunctivae and EOM are normal  Pupils are equal, round, and reactive to light  Scleral icterus is present  Neck: Normal range of motion  Neck supple  No JVD present  No thyromegaly present  Cardiovascular: Normal rate, regular rhythm and normal heart sounds  Pulmonary/Chest: Effort normal and breath sounds normal  No respiratory distress  She has no wheezes  She has no rales  She exhibits no tenderness  Abdominal: Soft  Bowel sounds are normal  She exhibits no distension and no mass  There is no tenderness  There is no rebound and no guarding  Musculoskeletal: Normal range of motion  She exhibits no edema, tenderness or deformity  Lymphadenopathy:     She has no cervical adenopathy  Neurological: She is alert and oriented to person, place, and time  She has normal reflexes  She displays normal reflexes  No cranial nerve deficit   She exhibits normal muscle tone  Coordination normal    Skin: Skin is warm and dry  No rash noted  She is not diaphoretic  No erythema  No pallor  Psychiatric: She has a normal mood and affect   Her behavior is normal  Judgment and thought content normal

## 2018-03-02 ENCOUNTER — TELEPHONE (OUTPATIENT)
Dept: INTERNAL MEDICINE CLINIC | Facility: CLINIC | Age: 73
End: 2018-03-02

## 2018-03-02 DIAGNOSIS — B37.0 THRUSH: Primary | ICD-10-CM

## 2018-03-02 RX ORDER — FLUCONAZOLE 100 MG/1
100 TABLET ORAL DAILY
Qty: 7 TABLET | Refills: 0 | Status: SHIPPED | OUTPATIENT
Start: 2018-03-02 | End: 2018-03-09

## 2018-03-15 ENCOUNTER — TELEPHONE (OUTPATIENT)
Dept: INTERNAL MEDICINE CLINIC | Facility: CLINIC | Age: 73
End: 2018-03-15

## 2018-03-15 ENCOUNTER — TRANSCRIBE ORDERS (OUTPATIENT)
Dept: LAB | Facility: CLINIC | Age: 73
End: 2018-03-15

## 2018-03-15 ENCOUNTER — LAB (OUTPATIENT)
Dept: LAB | Facility: CLINIC | Age: 73
End: 2018-03-15
Payer: MEDICARE

## 2018-03-15 DIAGNOSIS — B37.0 CANDIDIASIS OF MOUTH: Primary | ICD-10-CM

## 2018-03-15 DIAGNOSIS — B37.0 CANDIDIASIS OF MOUTH: ICD-10-CM

## 2018-03-15 DIAGNOSIS — B37.0 THRUSH, ORAL: Primary | ICD-10-CM

## 2018-03-15 LAB
ANION GAP SERPL CALCULATED.3IONS-SCNC: 6 MMOL/L (ref 4–13)
BUN SERPL-MCNC: 13 MG/DL (ref 5–25)
CALCIUM SERPL-MCNC: 8.5 MG/DL (ref 8.3–10.1)
CHLORIDE SERPL-SCNC: 107 MMOL/L (ref 100–108)
CO2 SERPL-SCNC: 28 MMOL/L (ref 21–32)
CREAT SERPL-MCNC: 0.58 MG/DL (ref 0.6–1.3)
EST. AVERAGE GLUCOSE BLD GHB EST-MCNC: 111 MG/DL
GFR SERPL CREATININE-BSD FRML MDRD: 92 ML/MIN/1.73SQ M
GLUCOSE P FAST SERPL-MCNC: 87 MG/DL (ref 65–99)
HBA1C MFR BLD: 5.5 % (ref 4.2–6.3)
POTASSIUM SERPL-SCNC: 4 MMOL/L (ref 3.5–5.3)
SODIUM SERPL-SCNC: 141 MMOL/L (ref 136–145)

## 2018-03-15 PROCEDURE — 80048 BASIC METABOLIC PNL TOTAL CA: CPT

## 2018-03-15 PROCEDURE — 83036 HEMOGLOBIN GLYCOSYLATED A1C: CPT

## 2018-03-15 PROCEDURE — 36415 COLL VENOUS BLD VENIPUNCTURE: CPT

## 2018-03-15 RX ORDER — CLOTRIMAZOLE 10 MG/1
10 LOZENGE ORAL; TOPICAL 4 TIMES DAILY
Qty: 40 TABLET | Refills: 0 | Status: SHIPPED | OUTPATIENT
Start: 2018-03-15 | End: 2018-04-23 | Stop reason: ALTCHOICE

## 2018-03-15 NOTE — TELEPHONE ENCOUNTER
Called pt back, confirmed that pt has tried mycelex trouches, a pink mouth wash, and the diflucan so far    Completed all 3 therapies   Sxs resolved for 10 days and then returned following the use of the diflucan

## 2018-03-15 NOTE — TELEPHONE ENCOUNTER
I thought we gave her difucan orally because the other did not work    did that resolve it or did she not take that

## 2018-03-15 NOTE — TELEPHONE ENCOUNTER
If mycelex trouches qid for 10 days    Would setup ent evaluation for recurrent sxs    Does she have upcomign appt

## 2018-03-16 ENCOUNTER — TELEPHONE (OUTPATIENT)
Dept: INTERNAL MEDICINE CLINIC | Facility: CLINIC | Age: 73
End: 2018-03-16

## 2018-03-16 DIAGNOSIS — B37.0 ORAL THRUSH: Primary | ICD-10-CM

## 2018-03-16 RX ORDER — FLUCONAZOLE 100 MG/1
100 TABLET ORAL DAILY
Qty: 10 TABLET | Refills: 0 | Status: SHIPPED | OUTPATIENT
Start: 2018-03-16 | End: 2018-03-26

## 2018-04-23 ENCOUNTER — OFFICE VISIT (OUTPATIENT)
Dept: INTERNAL MEDICINE CLINIC | Facility: CLINIC | Age: 73
End: 2018-04-23
Payer: MEDICARE

## 2018-04-23 VITALS
OXYGEN SATURATION: 100 % | WEIGHT: 127.38 LBS | DIASTOLIC BLOOD PRESSURE: 70 MMHG | BODY MASS INDEX: 22.57 KG/M2 | TEMPERATURE: 96.9 F | HEIGHT: 63 IN | HEART RATE: 58 BPM | SYSTOLIC BLOOD PRESSURE: 122 MMHG

## 2018-04-23 DIAGNOSIS — E78.2 MIXED HYPERLIPIDEMIA: ICD-10-CM

## 2018-04-23 DIAGNOSIS — J30.2 SEASONAL ALLERGIC RHINITIS, UNSPECIFIED TRIGGER: ICD-10-CM

## 2018-04-23 DIAGNOSIS — D51.0 PERNICIOUS ANEMIA: Primary | ICD-10-CM

## 2018-04-23 DIAGNOSIS — G44.86 CERVICOGENIC HEADACHE: ICD-10-CM

## 2018-04-23 DIAGNOSIS — B37.0 ORAL THRUSH: ICD-10-CM

## 2018-04-23 DIAGNOSIS — E06.3 HYPOTHYROIDISM DUE TO HASHIMOTO'S THYROIDITIS: ICD-10-CM

## 2018-04-23 DIAGNOSIS — E03.8 HYPOTHYROIDISM DUE TO HASHIMOTO'S THYROIDITIS: ICD-10-CM

## 2018-04-23 PROCEDURE — 99214 OFFICE O/P EST MOD 30 MIN: CPT | Performed by: INTERNAL MEDICINE

## 2018-04-23 RX ORDER — LEVOCETIRIZINE DIHYDROCHLORIDE 5 MG/1
5 TABLET, FILM COATED ORAL EVERY EVENING
Qty: 30 TABLET | Refills: 5 | Status: SHIPPED | OUTPATIENT
Start: 2018-04-23 | End: 2020-03-02 | Stop reason: ALTCHOICE

## 2018-04-23 NOTE — PROGRESS NOTES
Assessment/Plan:     Diagnoses and all orders for this visit:    Mixed hyperlipidemia  -     Lipid panel; Future  Patient has been off statin since December so will recheck profile  She states her headaches have resolved since off Rx  Oral thrush  Sxs are resolved after a few scripts and she has not had recurrent sxs - unclear etiology but likely stress was a factor    Cervicogenic headache  Presently sxs are resolved - pt feels resolved since off the statin? Cruz labs pending and will determine if recommend trial alternate rx    Rx for fbw and recheck tsh/b12 ?source of fatigue sxs  Routine appt in 4 months but patient aware may need to try alternate rx for her cholesterol     Patient ID: Herberth Marquez is a 67 y o  female  HPI   Pt thrush has resolved - she did see ent but her sxs were almost gone by then  Her headaches have resolved she feels since off the statin since December  No chest pain or sob  She does walk for exercise    Some allergy sxs and wants rx for xyzal which she has used in the past  She is tired moreso recently - not sleeping great lately she thinks due to worrying about her family in general       The following portions of the patient's history were reviewed and updated as appropriate:   Past Medical History:   Diagnosis Date    Anemia     pernicious    Anxiety     Cancer ([address] )     colon    Depression     Disease of thyroid gland     GERD (gastroesophageal reflux disease)     Hyperlipidemia     Migraine     Osteoporosis      Past Surgical History:   Procedure Laterality Date    APPENDECTOMY      BREAST SURGERY Right     cyst    COLON SURGERY      hemicolectomy    HYSTERECTOMY      MOHS SURGERY      head    [place] COLONOSCOPY FLX DX W/COLLJ SPEC WHEN PFRMD N/A 3/18/2016    Procedure: COLONOSCOPY;  Surgeon: Juan Sanchez MD;  [facility]: [facility];  Service: Gastroenterology    [place] ESOPHAGOGASTRODUODENOSCOPY TRANSORAL DIAGNOSTIC N/A 3/18/2016    Procedure: ESOPHAGOGASTRODUODENOSCOPY (EGD); Surgeon: Dallas Hampton MD;  [facility]: [facility];  Service: Gastroenterology     Allergies   Allergen Reactions    Alprazolam     Avelox [Moxifloxacin]     Demerol [Meperidine]     Erythromycin     Lipitor [Atorvastatin] Diarrhea    Shellfish-Derived Products      Social History     Social History    Marital status: /Civil Union     Spouse name: N/A    Number of children: N/A    Years of education: N/A     Occupational History    Not on file  Social History Main Topics    Smoking status: Never Smoker    Smokeless tobacco: Never Used    Alcohol use Yes      Comment: social    Drug use: No    Sexual activity: Not on file     Other Topics Concern    Not on file     Social History Narrative    No narrative on file         Review of Systems   Constitutional: Negative  HENT: Negative  Eyes: Negative  Respiratory: Negative  Cardiovascular: Negative  Gastrointestinal: Negative  Endocrine: Negative  Genitourinary: Negative  Musculoskeletal: Positive for back pain  Skin: Negative  Allergic/Immunologic: Negative  Neurological: Negative  Hematological: Negative  Psychiatric/Behavioral: Negative  /70   Pulse 58   Temp (!) 96 9 °F (36 1 °C) (Tympanic)   Ht 5' 3" (1 6 m)   Wt 57 8 kg (127 lb 6 oz)   SpO2 100%   BMI 22 56 kg/m²      Physical Exam   Constitutional: She is oriented to person, place, and time  She appears well-developed and well-nourished  No distress  HENT:   Head: Normocephalic and atraumatic  Right Ear: External ear normal    Left Ear: External ear normal    Nose: Nose normal    Mouth/Throat: Oropharynx is clear and moist  No oropharyngeal exudate  Eyes: Conjunctivae and EOM are normal  Pupils are equal, round, and reactive to light  No scleral icterus  Neck: Normal range of motion  Neck supple  No JVD present  No thyromegaly present     Cardiovascular: Normal rate, regular rhythm, normal heart sounds and intact distal pulses  Pulmonary/Chest: Effort normal and breath sounds normal    Abdominal: Soft  Bowel sounds are normal    Musculoskeletal: Normal range of motion  She exhibits no edema, tenderness or deformity  Lymphadenopathy:     She has no cervical adenopathy  Neurological: She is alert and oriented to person, place, and time  She has normal reflexes  She displays normal reflexes  No cranial nerve deficit  She exhibits normal muscle tone  Coordination normal    Skin: Skin is warm and dry  No rash noted  She is not diaphoretic  No erythema  No pallor  Psychiatric: She has a normal mood and affect  Her behavior is normal  Judgment and thought content normal    Nursing note and vitals reviewed

## 2018-05-01 ENCOUNTER — APPOINTMENT (OUTPATIENT)
Dept: LAB | Facility: CLINIC | Age: 73
End: 2018-05-01
Payer: MEDICARE

## 2018-05-01 DIAGNOSIS — D51.0 PERNICIOUS ANEMIA: ICD-10-CM

## 2018-05-01 DIAGNOSIS — E78.2 MIXED HYPERLIPIDEMIA: ICD-10-CM

## 2018-05-01 DIAGNOSIS — E06.3 HYPOTHYROIDISM DUE TO HASHIMOTO'S THYROIDITIS: ICD-10-CM

## 2018-05-01 DIAGNOSIS — E03.8 HYPOTHYROIDISM DUE TO HASHIMOTO'S THYROIDITIS: ICD-10-CM

## 2018-05-01 LAB
CHOLEST SERPL-MCNC: 222 MG/DL (ref 50–200)
ERYTHROCYTE [DISTWIDTH] IN BLOOD BY AUTOMATED COUNT: 12.8 % (ref 11.6–15.1)
HCT VFR BLD AUTO: 42.1 % (ref 34.8–46.1)
HDLC SERPL-MCNC: 70 MG/DL (ref 40–60)
HGB BLD-MCNC: 13.5 G/DL (ref 11.5–15.4)
LDLC SERPL CALC-MCNC: 140 MG/DL (ref 0–100)
MCH RBC QN AUTO: 30.2 PG (ref 26.8–34.3)
MCHC RBC AUTO-ENTMCNC: 32.1 G/DL (ref 31.4–37.4)
MCV RBC AUTO: 94 FL (ref 82–98)
NONHDLC SERPL-MCNC: 152 MG/DL
PLATELET # BLD AUTO: 306 THOUSANDS/UL (ref 149–390)
PMV BLD AUTO: 9.3 FL (ref 8.9–12.7)
RBC # BLD AUTO: 4.47 MILLION/UL (ref 3.81–5.12)
TRIGL SERPL-MCNC: 59 MG/DL
TSH SERPL DL<=0.05 MIU/L-ACNC: 0.74 UIU/ML (ref 0.36–3.74)
VIT B12 SERPL-MCNC: 195 PG/ML (ref 100–900)
WBC # BLD AUTO: 4.59 THOUSAND/UL (ref 4.31–10.16)

## 2018-05-01 PROCEDURE — 84443 ASSAY THYROID STIM HORMONE: CPT

## 2018-05-01 PROCEDURE — 85027 COMPLETE CBC AUTOMATED: CPT

## 2018-05-01 PROCEDURE — 80061 LIPID PANEL: CPT

## 2018-05-01 PROCEDURE — 82607 VITAMIN B-12: CPT

## 2018-05-01 PROCEDURE — 36415 COLL VENOUS BLD VENIPUNCTURE: CPT

## 2018-05-02 DIAGNOSIS — E78.5 HYPERLIPIDEMIA, UNSPECIFIED HYPERLIPIDEMIA TYPE: Primary | ICD-10-CM

## 2018-05-02 RX ORDER — EZETIMIBE 10 MG/1
10 TABLET ORAL DAILY
Qty: 30 TABLET | Refills: 0 | Status: SHIPPED | OUTPATIENT
Start: 2018-05-02 | End: 2018-08-22 | Stop reason: ALTCHOICE

## 2018-07-29 DIAGNOSIS — E03.9 ACQUIRED HYPOTHYROIDISM: Primary | ICD-10-CM

## 2018-07-29 RX ORDER — LEVOTHYROXINE SODIUM 75 MCG
TABLET ORAL
Qty: 90 TABLET | Refills: 2 | Status: SHIPPED | OUTPATIENT
Start: 2018-07-29 | End: 2019-04-06 | Stop reason: SDUPTHER

## 2018-08-22 ENCOUNTER — OFFICE VISIT (OUTPATIENT)
Dept: INTERNAL MEDICINE CLINIC | Facility: CLINIC | Age: 73
End: 2018-08-22
Payer: MEDICARE

## 2018-08-22 VITALS
HEIGHT: 63 IN | TEMPERATURE: 97.5 F | HEART RATE: 62 BPM | WEIGHT: 126.25 LBS | DIASTOLIC BLOOD PRESSURE: 60 MMHG | SYSTOLIC BLOOD PRESSURE: 98 MMHG | BODY MASS INDEX: 22.37 KG/M2 | OXYGEN SATURATION: 95 %

## 2018-08-22 DIAGNOSIS — R53.83 FATIGUE, UNSPECIFIED TYPE: Primary | ICD-10-CM

## 2018-08-22 DIAGNOSIS — K58.9 IRRITABLE BOWEL SYNDROME, UNSPECIFIED TYPE: ICD-10-CM

## 2018-08-22 DIAGNOSIS — E03.9 ACQUIRED HYPOTHYROIDISM: ICD-10-CM

## 2018-08-22 PROCEDURE — 99214 OFFICE O/P EST MOD 30 MIN: CPT | Performed by: INTERNAL MEDICINE

## 2018-08-22 NOTE — PROGRESS NOTES
Assessment/Plan:         Diagnoses and all orders for this visit:    Fatigue, unspecified type  Rx for labs  Will consider Sleep study if labs wnl    Acquired hypothyroidism  Recheck tsh    Irritable bowel syndrome, unspecified type  Increase exercise, water intake  Did discuss antispasmodics    Other orders  -     MECLIZINE HCL PO; Take by mouth    Rto 4 months/prn       Patient ID: Yeny Mosley is a 75 y o  female  HPI   Patient complaint of fatigue  She does snore and at times has difficulty sleeping  Takes advil pm prn  Does exercise and drink water  She has to have another Mohs procedure for scalp lesion  No limiting back pain    The following portions of the patient's history were reviewed and updated as appropriate:   Past Medical History:   Diagnosis Date    Anemia     pernicious    Anxiety     Cancer ([address] )     colon, Last Assessed:  10/27/15    Depression     Disease of thyroid gland     GERD (gastroesophageal reflux disease)     Hypercholesterolemia     Hyperlipidemia     Migraine     Osteoporosis      Past Surgical History:   Procedure Laterality Date    APPENDECTOMY      BREAST SURGERY Right     cyst    COLON SURGERY  1998    hemicolectomy    HYSTERECTOMY      MOHS SURGERY      head    OOPHORECTOMY      [place] COLONOSCOPY FLX DX W/COLLJ SPEC WHEN PFRMD N/A 3/18/2016    Procedure: COLONOSCOPY;  Surgeon: Opal Clemente MD;  [facility];  Service: Gastroenterology    MD ESOPHAGOGASTRODUODENOSCOPY TRANSORAL DIAGNOSTIC N/A 3/18/2016    Procedure: ESOPHAGOGASTRODUODENOSCOPY (EGD); Surgeon: Opal Clemente MD;  [facility];  Service: Gastroenterology     Social History     Social History    Marital status: /Civil Union     Spouse name: N/A    Number of children: N/A    Years of education: N/A     Occupational History    Not on file       Social History Main Topics    Smoking status: Never Smoker    Smokeless tobacco: Never Used    Alcohol use Yes      Comment: social    Drug use: No    Sexual activity: Not on file     Other Topics Concern    Not on file     Social History Narrative    Always uses sunscreen    Caffeine use    Dental care, regularly    Lives independently with spouse    Uses safety equipment - seatbelts     Allergies   Allergen Reactions    Alprazolam     Avelox [Moxifloxacin]     Demerol [Meperidine]     Erythromycin     Lipitor [Atorvastatin] Diarrhea    Shellfish-Derived Products          Review of Systems   Constitutional: Negative  HENT: Negative  Eyes: Negative  Respiratory: Negative  Cardiovascular: Negative  Gastrointestinal: Negative  Endocrine: Negative  Genitourinary: Negative  Musculoskeletal: Positive for back pain  Skin: Negative  Allergic/Immunologic: Negative  Neurological: Positive for dizziness  Hematological: Negative  Psychiatric/Behavioral: Negative  BP 98/60   Pulse 62   Temp 97 5 °F (36 4 °C) (Tympanic)   Ht 5' 3" (1 6 m)   Wt 57 3 kg (126 lb 4 oz)   SpO2 95%   BMI 22 36 kg/m²      Physical Exam   Constitutional: She is oriented to person, place, and time  She appears well-developed and well-nourished  No distress  HENT:   Head: Normocephalic and atraumatic  Mouth/Throat: No oropharyngeal exudate  Eyes: Conjunctivae and EOM are normal  Pupils are equal, round, and reactive to light  No scleral icterus  Neck: Normal range of motion  Neck supple  No JVD present  Cardiovascular: Normal rate and regular rhythm  Pulmonary/Chest: Effort normal and breath sounds normal    Abdominal: Soft  Bowel sounds are normal    Musculoskeletal: Normal range of motion  Lymphadenopathy:     She has no cervical adenopathy  Neurological: She is alert and oriented to person, place, and time  She has normal reflexes  Skin: Skin is warm and dry  She is not diaphoretic  Psychiatric: She has a normal mood and affect   Her behavior is normal  Judgment and thought content normal    Nursing note and vitals reviewed

## 2018-08-24 ENCOUNTER — LAB (OUTPATIENT)
Dept: LAB | Facility: CLINIC | Age: 73
End: 2018-08-24
Payer: MEDICARE

## 2018-08-24 DIAGNOSIS — E03.9 ACQUIRED HYPOTHYROIDISM: ICD-10-CM

## 2018-08-24 DIAGNOSIS — K58.9 IRRITABLE BOWEL SYNDROME, UNSPECIFIED TYPE: ICD-10-CM

## 2018-08-24 DIAGNOSIS — R53.83 FATIGUE, UNSPECIFIED TYPE: ICD-10-CM

## 2018-08-24 LAB
ALBUMIN SERPL BCP-MCNC: 3.6 G/DL (ref 3.5–5)
ALP SERPL-CCNC: 84 U/L (ref 46–116)
ALT SERPL W P-5'-P-CCNC: 21 U/L (ref 12–78)
ANION GAP SERPL CALCULATED.3IONS-SCNC: 5 MMOL/L (ref 4–13)
AST SERPL W P-5'-P-CCNC: 15 U/L (ref 5–45)
BILIRUB SERPL-MCNC: 0.64 MG/DL (ref 0.2–1)
BUN SERPL-MCNC: 12 MG/DL (ref 5–25)
CALCIUM SERPL-MCNC: 9.2 MG/DL (ref 8.3–10.1)
CHLORIDE SERPL-SCNC: 102 MMOL/L (ref 100–108)
CK SERPL-CCNC: 55 U/L (ref 26–192)
CO2 SERPL-SCNC: 27 MMOL/L (ref 21–32)
CREAT SERPL-MCNC: 0.72 MG/DL (ref 0.6–1.3)
GFR SERPL CREATININE-BSD FRML MDRD: 83 ML/MIN/1.73SQ M
GLUCOSE SERPL-MCNC: 88 MG/DL (ref 65–140)
MAGNESIUM SERPL-MCNC: 2.3 MG/DL (ref 1.6–2.6)
POTASSIUM SERPL-SCNC: 4.6 MMOL/L (ref 3.5–5.3)
PROT SERPL-MCNC: 6.4 G/DL (ref 6.4–8.2)
SODIUM SERPL-SCNC: 134 MMOL/L (ref 136–145)
TSH SERPL DL<=0.05 MIU/L-ACNC: 0.77 UIU/ML

## 2018-08-24 PROCEDURE — 80053 COMPREHEN METABOLIC PANEL: CPT

## 2018-08-24 PROCEDURE — 84443 ASSAY THYROID STIM HORMONE: CPT

## 2018-08-24 PROCEDURE — 82550 ASSAY OF CK (CPK): CPT

## 2018-08-24 PROCEDURE — 83735 ASSAY OF MAGNESIUM: CPT

## 2018-08-24 PROCEDURE — 36415 COLL VENOUS BLD VENIPUNCTURE: CPT

## 2018-11-06 ENCOUNTER — IMMUNIZATION (OUTPATIENT)
Dept: INTERNAL MEDICINE CLINIC | Facility: CLINIC | Age: 73
End: 2018-11-06
Payer: MEDICARE

## 2018-11-06 DIAGNOSIS — Z23 ENCOUNTER FOR IMMUNIZATION: ICD-10-CM

## 2018-11-06 PROCEDURE — G0008 ADMIN INFLUENZA VIRUS VAC: HCPCS

## 2018-11-06 PROCEDURE — 90662 IIV NO PRSV INCREASED AG IM: CPT

## 2019-01-08 ENCOUNTER — OFFICE VISIT (OUTPATIENT)
Dept: INTERNAL MEDICINE CLINIC | Facility: CLINIC | Age: 74
End: 2019-01-08
Payer: MEDICARE

## 2019-01-08 VITALS
HEIGHT: 63 IN | HEART RATE: 67 BPM | BODY MASS INDEX: 22.55 KG/M2 | TEMPERATURE: 97.2 F | WEIGHT: 127.25 LBS | OXYGEN SATURATION: 98 %

## 2019-01-08 DIAGNOSIS — Z00.00 MEDICARE ANNUAL WELLNESS VISIT, SUBSEQUENT: ICD-10-CM

## 2019-01-08 DIAGNOSIS — E78.5 HYPERLIPIDEMIA, UNSPECIFIED HYPERLIPIDEMIA TYPE: ICD-10-CM

## 2019-01-08 DIAGNOSIS — K59.1 FUNCTIONAL DIARRHEA: Primary | ICD-10-CM

## 2019-01-08 PROCEDURE — G0009 ADMIN PNEUMOCOCCAL VACCINE: HCPCS

## 2019-01-08 PROCEDURE — 90670 PCV13 VACCINE IM: CPT

## 2019-01-08 PROCEDURE — G0439 PPPS, SUBSEQ VISIT: HCPCS | Performed by: INTERNAL MEDICINE

## 2019-01-08 NOTE — PROGRESS NOTES
Assessment and Plan:    Problem List Items Addressed This Visit     Hyperlipidemia    Relevant Orders    Lipid panel    Medicare annual wellness visit, subsequent    Relevant Orders    PNEUMOCOCCAL CONJUGATE VACCINE 13-VALENT LESS THAN 5Y0  (Prevnar 13)      Other Visit Diagnoses     Functional diarrhea    -  Primary    Relevant Orders    Comprehensive metabolic panel    TSH baseline    CBC and differential        Health Maintenance Due   Topic Date Due    Medicare Annual Wellness Visit (AWV)  08/30/2018    MAMMOGRAM  01/12/2019   Discussed prevnar and agrees to have today  Aware of shingrix  Has eye exam tomorrow  She has GI follow up in January Colonoscopy Jan 26  Rx for labs   Rto 6 months             HPI:  Chris Herrera is a 75 y o  female here for her Subsequent Wellness Visit     She has had some decline in vision and has appt tomorrow  She has colonoscopy 1/26 and has had diarrhea     Patient Active Problem List   Diagnosis    Allodynia    Atypical chest pain    Bradycardia    Cervicogenic headache    Depression with anxiety    Esophageal reflux    Glaucoma    Hyperlipidemia    Hypothyroidism    Irritable bowel syndrome    Lumbar disc displacement without myelopathy    Migraine, unspecified, not intractable, without status migrainosus    Osteoporosis    Pernicious anemia    Oral thrush    Fatigue    Medicare annual wellness visit, subsequent     Past Medical History:   Diagnosis Date    Anemia     pernicious    Anxiety     Cancer ([address] )     colon, Last Assessed:  10/27/15    Depression     Disease of thyroid gland     GERD (gastroesophageal reflux disease)     Hypercholesterolemia     Hyperlipidemia     Migraine     Osteoporosis      Past Surgical History:   Procedure Laterality Date    APPENDECTOMY      BREAST SURGERY Right     cyst    COLON SURGERY  1998    hemicolectomy    HYSTERECTOMY      MOHS SURGERY      head    OOPHORECTOMY      [place] COLONOSCOPY FLX DX W/COLLJ SPEC WHEN PFRMD N/A 3/18/2016    Procedure: COLONOSCOPY;  Surgeon: Reid Crawley MD;  [facility];  Service: Gastroenterology    [place] ESOPHAGOGASTRODUODENOSCOPY TRANSORAL DIAGNOSTIC N/A 3/18/2016    Procedure: ESOPHAGOGASTRODUODENOSCOPY (EGD); Surgeon: Reid Crawley MD;  [facility];  Service: Gastroenterology     Family History   Problem Relation Age of Onset    Coronary artery disease Mother     Colon cancer Mother     Lung cancer Mother     Other Mother         Stroke syndrome    Throat cancer Father     Breast cancer Family     Coronary artery disease Family      History   Smoking Status    Never Smoker   Smokeless Tobacco    Never Used     History   Alcohol Use    Yes     Comment: social      History   Drug Use No       Current Outpatient Prescriptions   Medication Sig Dispense Refill    AZOPT 1 % ophthalmic suspension       brimonidine-timolol (COMBIGAN) 0 2-0 5 % Apply to eye      Butalbital-APAP-Caffeine -40 MG CAPS Take by mouth      levocetirizine (XYZAL) 5 MG tablet Take 1 tablet (5 mg total) by mouth every evening 30 tablet 5    SYNTHROID 75 MCG tablet take 1 tablet by mouth once daily 90 tablet 2    MECLIZINE HCL PO Take by mouth       No current facility-administered medications for this visit        Allergies   Allergen Reactions    Alprazolam     Avelox [Moxifloxacin]     Demerol [Meperidine]     Erythromycin     Lipitor [Atorvastatin] Diarrhea    Shellfish-Derived Products      Immunization History   Administered Date(s) Administered    Influenza 01/01/2008    Influenza Quadrivalent Preservative Free 3 years and older IM 10/27/2015    Influenza Split High Dose Preservative Free IM 08/30/2017    Influenza TIV (IM) 11/15/2013, 10/23/2014, 12/08/2016    Influenza, high dose seasonal 0 5 mL 11/06/2018    Pneumococcal Polysaccharide PPV23 01/01/2007    Td (adult), adsorbed 01/01/2000    Zoster 12/17/2009       Patient Care Team:  Dick Cantu DO as PCP - General  Mateus Asencio DO Daryl Terryl Rosenthal, MD    Medicare Screening Tests and Risk Assessments:  Last Medicare Wellness visit information reviewed, patient interviewed and updates made to the record today  Health Risk Assessment:  Patient rates overall health as very good  Patient feels that their physical health rating is Same  Eyesight was rated as Slightly worse  Hearing was rated as Same  Patient feels that their emotional and mental health rating is Same  Pain experienced by patient in the last 7 days has been Some  Patient's pain rating has been 2/10  Patient states that she has experienced no weight loss or gain in last 6 months  Emotional/Mental Health:  Patient has been feeling nervous/anxious  PHQ-9 Depression Screening:    Frequency of the following problems over the past two weeks:      1  Little interest or pleasure in doing things: 0 - not at all      2  Feeling down, depressed, or hopeless: 0 - not at all  PHQ-2 Score: 0          Broken Bones/Falls: Fall Risk Assessment:    In the past year, patient has experienced: No history of falling in past year          Bladder/Bowel:  Patient has not leaked urine accidently in the last six months  Patient reports no loss of bowel control  Immunizations:  Patient has had a flu vaccination within the last year  Patient has received a pneumonia shot  Patient has received a shingles shot  Patient has received tetanus/diphtheria shot  (Additional Comments: Will get prevnar today)    Preventative Screenings:   Breast cancer screening performed, colon cancer screen completed, cholesterol screen completed, glaucoma eye exam completed,     Nutrition:  Current diet: Low Cholesterol with servings of the following:    Medications:  Patient is not currently taking any over-the-counter supplements  Patient is not able to manage medications  Lifestyle Choices:  Patient reports no tobacco use    Patient has not smoked or used tobacco in the past   Patient reports no alcohol use  Patient drives a vehicle  Patient wears seat belt  Activities of Daily Living:  Can get out of bed by his or her self, able to dress self, able to make own meals, able to do own shopping, able to bathe self, can do own laundry/housekeeping, can manage own money, pay bills and track expenses    Previous Hospitalizations:  No hospitalization or ED visit in past 12 months        Advanced Directives:  Patient has decided on a power of   Patient has spoken to designated power of   Patient has completed advanced directive  Preventative Screening/Counseling:      Cardiovascular:      General: Screening Current      Counseling: Healthy Diet and Healthy Weight          Diabetes:      General: Screening Current      Counseling: Healthy Diet and Healthy Weight          Colorectal Cancer:      Counseling: high fiber diet      Due for studies: Colonoscopy - Low Risk      Comments: Colonoscopy 1/26        Breast Cancer:      General: Screening Current          Cervical Cancer:      General: Screening Current          Osteoporosis:      General: Screening Current      Counseling: Calcium and Vitamin D Intake and Regular Weightbearing Exercise          AAA:      General: Screening Not Indicated          Glaucoma:      General: Screening Current          HIV:      General: Screening Not Indicated          Hepatitis C:      General: Screening Current        Advanced Directives:   Patient has living will for healthcare, has durable POA for healthcare, patient has an advanced directive  Information on ACP and/or AD not provided  End of life assessment reviewed with patient  Provider agrees with end of life decisions        Immunizations:      Influenza: Influenza UTD This Year      Pneumococcal: Pneumococcal Due Today      Shingrix: Risks & Benefits Discussed      Hepatitis B (Low risk patients): Series Not Indicated      Zostavax: Zostavax Vaccine UTD      TD: Vaccine Status Unknown      TDAP: Tdap Vaccine UTD      Other Preventative Counseling (Non-Medicare):   Increase physical activity

## 2019-01-08 NOTE — PROGRESS NOTES
Assessment and Plan:    Problem List Items Addressed This Visit     Hyperlipidemia    Relevant Orders    Lipid panel    Medicare annual wellness visit, subsequent    Relevant Orders    PNEUMOCOCCAL CONJUGATE VACCINE 13-VALENT LESS THAN 5Y0  (Prevnar 13)      Other Visit Diagnoses     Functional diarrhea    -  Primary    Relevant Orders    Comprehensive metabolic panel    TSH baseline    CBC and differential        Health Maintenance Due   Topic Date Due    Medicare Annual Wellness Visit (AWV)  08/30/2018    MAMMOGRAM  01/12/2019         HPI:  Georgina Mon is a 75 y o  female here for her Subsequent Wellness Visit  Patient Active Problem List   Diagnosis    Allodynia    Atypical chest pain    Bradycardia    Cervicogenic headache    Depression with anxiety    Esophageal reflux    Glaucoma    Hyperlipidemia    Hypothyroidism    Irritable bowel syndrome    Lumbar disc displacement without myelopathy    Migraine, unspecified, not intractable, without status migrainosus    Osteoporosis    Pernicious anemia    Oral thrush    Fatigue    Medicare annual wellness visit, subsequent     Past Medical History:   Diagnosis Date    Anemia     pernicious    Anxiety     Cancer ([facility] [address] )     colon, Last Assessed:  10/27/15    Depression     Disease of thyroid gland     GERD (gastroesophageal reflux disease)     Hypercholesterolemia     Hyperlipidemia     Migraine     Osteoporosis      Past Surgical History:   Procedure Laterality Date    APPENDECTOMY      BREAST SURGERY Right     cyst    COLON SURGERY  1998    hemicolectomy    HYSTERECTOMY      MOHS SURGERY      head    OOPHORECTOMY      UT COLONOSCOPY FLX DX W/COLLJ SPEC WHEN PFRMD N/A 3/18/2016    Procedure: COLONOSCOPY;  Surgeon: Latanya Butler MD;  [facility];  Service: Gastroenterology    UT ESOPHAGOGASTRODUODENOSCOPY TRANSORAL DIAGNOSTIC N/A 3/18/2016    Procedure: ESOPHAGOGASTRODUODENOSCOPY (EGD);   Surgeon: Latanya Butler MD; Location: [facility];  Service: Gastroenterology     Family History   Problem Relation Age of Onset    Coronary artery disease Mother     Colon cancer Mother     Lung cancer Mother     Other Mother         Stroke syndrome    Throat cancer Father     Breast cancer Family     Coronary artery disease Family      History   Smoking Status    Never Smoker   Smokeless Tobacco    Never Used     History   Alcohol Use    Yes     Comment: social      History   Drug Use No       Current Outpatient Prescriptions   Medication Sig Dispense Refill    AZOPT 1 % ophthalmic suspension       brimonidine-timolol (COMBIGAN) 0 2-0 5 % Apply to eye      Butalbital-APAP-Caffeine -40 MG CAPS Take by mouth      levocetirizine (XYZAL) 5 MG tablet Take 1 tablet (5 mg total) by mouth every evening 30 tablet 5    SYNTHROID 75 MCG tablet take 1 tablet by mouth once daily 90 tablet 2    MECLIZINE HCL PO Take by mouth       No current facility-administered medications for this visit  Allergies   Allergen Reactions    Alprazolam     Avelox [Moxifloxacin]     Demerol [Meperidine]     Erythromycin     Lipitor [Atorvastatin] Diarrhea    Shellfish-Derived Products      Immunization History   Administered Date(s) Administered    Influenza 01/01/2008    Influenza Quadrivalent Preservative Free 3 years and older IM 10/27/2015    Influenza Split High Dose Preservative Free IM 08/30/2017    Influenza TIV (IM) 11/15/2013, 10/23/2014, 12/08/2016    Influenza, high dose seasonal 0 5 mL 11/06/2018    Pneumococcal Polysaccharide PPV23 01/01/2007    Td (adult), adsorbed 01/01/2000    Zoster 12/17/2009       Patient Care Team:  Ajay Bell DO as PCP - General  Mark Penta, Donalda Phoenix, DO Daryl Mabeline Dimmer, MD    Medicare Screening Tests and Risk Assessments: Lyn Cortez is here for her Subsequent Wellness visit    Last Medicare Wellness visit information reviewed, patient interviewed and updates made to the record today  Health Risk Assessment:  Patient rates overall health as good  Patient feels that their physical health rating is Same  Eyesight was rated as Slightly worse  Hearing was rated as Same  Patient feels that their emotional and mental health rating is Same  Pain experienced by patient in the last 7 days has been None  Patient states that she has experienced no weight loss or gain in last 6 months  Emotional/Mental Health:  Patient has been feeling nervous/anxious  PHQ-9 Depression Screening:    Frequency of the following problems over the past two weeks:      1  Little interest or pleasure in doing things: 0 - not at all      2  Feeling down, depressed, or hopeless: 0 - not at all  PHQ-2 Score: 0          Broken Bones/Falls: Fall Risk Assessment:    In the past year, patient has experienced: No history of falling in past year          Bladder/Bowel:  Patient has not leaked urine accidently in the last six months  Patient reports no loss of bowel control  Immunizations:  Patient has had a flu vaccination within the last year  Patient has received a pneumonia shot  Patient has received a shingles shot  Patient has received tetanus/diphtheria shot  Home Safety:  Patient does not have trouble with stairs inside or outside of their home  Patient currently reports that there are no safety hazards present in home, working smoke alarms, working carbon monoxide detectors  Preventative Screenings:   Breast cancer screening performed, colon cancer screen completed, cholesterol screen completed, glaucoma eye exam completed,     Nutrition:  Current diet: Regular with servings of the following:    Medications:  Patient is not currently taking any over-the-counter supplements  Patient is not able to manage medications  Lifestyle Choices:  Patient reports no tobacco use  Patient has not smoked or used tobacco in the past   Patient reports no alcohol use  Patient drives a vehicle  Patient wears seat belt  Current level of exercise of physical activity described by patient as: daily walking and household activities  Activities of Daily Living:  Can get out of bed by his or her self, able to dress self, able to make own meals, able to do own shopping, able to bathe self, can do own laundry/housekeeping, can manage own money, pay bills and track expenses    Previous Hospitalizations:  No hospitalization or ED visit in past 12 months        Advanced Directives:  Patient has decided on a power of   Patient has spoken to designated power of   Patient has completed advanced directive  Preventative Screening/Counseling:      Cardiovascular:      General: Screening Current      Counseling: Healthy Diet and Healthy Weight          Diabetes:      General: Screening Current      Counseling: Healthy Diet and Healthy Weight          Colorectal Cancer:      Counseling: high fiber diet      Due for studies: Colonoscopy - Low Risk          Breast Cancer:      General: Screening Current          Cervical Cancer:      General: Screening Current          Osteoporosis:      General: Screening Current      Counseling: Calcium and Vitamin D Intake and Regular Weightbearing Exercise          AAA:      General: Screening Not Indicated          Glaucoma:      General: Screening Current          HIV:      General: Screening Not Indicated          Hepatitis C:      General: Screening Current        Advanced Directives:   Patient has living will for healthcare, has durable POA for healthcare, patient has an advanced directive  Information on ACP and/or AD not provided  End of life assessment reviewed with patient  Provider agrees with end of life decisions        Immunizations:      Influenza: Influenza UTD This Year      Pneumococcal: Lifetime Vaccine Completed      Shingrix: Risks & Benefits Discussed      Hepatitis B (Low risk patients): Series Not Indicated      Zostavax: Zostavax Vaccine UTD      TD: Vaccine Status Unknown      TDAP: Tdap Vaccine UTD      Other Preventative Counseling (Non-Medicare):   Increase physical activity

## 2019-01-16 ENCOUNTER — APPOINTMENT (OUTPATIENT)
Dept: LAB | Facility: CLINIC | Age: 74
End: 2019-01-16
Payer: MEDICARE

## 2019-01-16 DIAGNOSIS — E78.5 HYPERLIPIDEMIA, UNSPECIFIED HYPERLIPIDEMIA TYPE: ICD-10-CM

## 2019-01-16 DIAGNOSIS — B37.0 ORAL THRUSH: ICD-10-CM

## 2019-01-16 DIAGNOSIS — R19.4 FREQUENT BOWEL MOVEMENTS: Primary | ICD-10-CM

## 2019-01-16 DIAGNOSIS — K59.1 FUNCTIONAL DIARRHEA: ICD-10-CM

## 2019-01-16 LAB
ALBUMIN SERPL BCP-MCNC: 3.8 G/DL (ref 3.5–5)
ALP SERPL-CCNC: 89 U/L (ref 46–116)
ALT SERPL W P-5'-P-CCNC: 25 U/L (ref 12–78)
ANION GAP SERPL CALCULATED.3IONS-SCNC: 7 MMOL/L (ref 4–13)
AST SERPL W P-5'-P-CCNC: 20 U/L (ref 5–45)
BASOPHILS # BLD MANUAL: 0.15 THOUSAND/UL (ref 0–0.1)
BASOPHILS NFR MAR MANUAL: 3 % (ref 0–1)
BILIRUB SERPL-MCNC: 0.59 MG/DL (ref 0.2–1)
BUN SERPL-MCNC: 13 MG/DL (ref 5–25)
CALCIUM SERPL-MCNC: 9.2 MG/DL (ref 8.3–10.1)
CHLORIDE SERPL-SCNC: 106 MMOL/L (ref 100–108)
CHOLEST SERPL-MCNC: 259 MG/DL (ref 50–200)
CO2 SERPL-SCNC: 27 MMOL/L (ref 21–32)
CREAT SERPL-MCNC: 0.62 MG/DL (ref 0.6–1.3)
EOSINOPHIL # BLD MANUAL: 0.1 THOUSAND/UL (ref 0–0.4)
EOSINOPHIL NFR BLD MANUAL: 2 % (ref 0–6)
ERYTHROCYTE [DISTWIDTH] IN BLOOD BY AUTOMATED COUNT: 12.2 % (ref 11.6–15.1)
EST. AVERAGE GLUCOSE BLD GHB EST-MCNC: 117 MG/DL
GFR SERPL CREATININE-BSD FRML MDRD: 89 ML/MIN/1.73SQ M
GLUCOSE P FAST SERPL-MCNC: 102 MG/DL (ref 65–99)
HBA1C MFR BLD: 5.7 % (ref 4.2–6.3)
HCT VFR BLD AUTO: 41.9 % (ref 34.8–46.1)
HDLC SERPL-MCNC: 68 MG/DL (ref 40–60)
HGB BLD-MCNC: 13.4 G/DL (ref 11.5–15.4)
IGA SERPL-MCNC: 227 MG/DL (ref 70–400)
LDLC SERPL CALC-MCNC: 170 MG/DL (ref 0–100)
LYMPHOCYTES # BLD AUTO: 2.27 THOUSAND/UL (ref 0.6–4.47)
LYMPHOCYTES # BLD AUTO: 46 % (ref 14–44)
MCH RBC QN AUTO: 29.8 PG (ref 26.8–34.3)
MCHC RBC AUTO-ENTMCNC: 32 G/DL (ref 31.4–37.4)
MCV RBC AUTO: 93 FL (ref 82–98)
MONOCYTES # BLD AUTO: 0.25 THOUSAND/UL (ref 0–1.22)
MONOCYTES NFR BLD: 5 % (ref 4–12)
NEUTROPHILS # BLD MANUAL: 2.12 THOUSAND/UL (ref 1.85–7.62)
NEUTS SEG NFR BLD AUTO: 43 % (ref 43–75)
NONHDLC SERPL-MCNC: 191 MG/DL
NRBC BLD AUTO-RTO: 0 /100 WBCS
PLATELET # BLD AUTO: 323 THOUSANDS/UL (ref 149–390)
PLATELET BLD QL SMEAR: ADEQUATE
PMV BLD AUTO: 9.1 FL (ref 8.9–12.7)
POTASSIUM SERPL-SCNC: 4 MMOL/L (ref 3.5–5.3)
PROT SERPL-MCNC: 6.7 G/DL (ref 6.4–8.2)
RBC # BLD AUTO: 4.5 MILLION/UL (ref 3.81–5.12)
SMUDGE CELLS BLD QL SMEAR: PRESENT
SODIUM SERPL-SCNC: 140 MMOL/L (ref 136–145)
TOTAL CELLS COUNTED SPEC: 100
TRIGL SERPL-MCNC: 103 MG/DL
TSH SERPL DL<=0.05 MIU/L-ACNC: 0.32 UIU/ML
VARIANT LYMPHS # BLD AUTO: 1 %
WBC # BLD AUTO: 4.93 THOUSAND/UL (ref 4.31–10.16)

## 2019-01-16 PROCEDURE — 85007 BL SMEAR W/DIFF WBC COUNT: CPT

## 2019-01-16 PROCEDURE — 83516 IMMUNOASSAY NONANTIBODY: CPT

## 2019-01-16 PROCEDURE — 80053 COMPREHEN METABOLIC PANEL: CPT

## 2019-01-16 PROCEDURE — 85027 COMPLETE CBC AUTOMATED: CPT

## 2019-01-16 PROCEDURE — 80061 LIPID PANEL: CPT

## 2019-01-16 PROCEDURE — 83036 HEMOGLOBIN GLYCOSYLATED A1C: CPT

## 2019-01-16 PROCEDURE — 36415 COLL VENOUS BLD VENIPUNCTURE: CPT

## 2019-01-16 PROCEDURE — 82784 ASSAY IGA/IGD/IGG/IGM EACH: CPT

## 2019-01-16 PROCEDURE — 84443 ASSAY THYROID STIM HORMONE: CPT

## 2019-01-18 LAB — TTG IGA SER-ACNC: <2 U/ML (ref 0–3)

## 2019-01-31 RX ORDER — TIMOLOL MALEATE 5 MG/ML
SOLUTION/ DROPS OPHTHALMIC
Refills: 0 | COMMUNITY
Start: 2019-01-29

## 2019-02-04 ENCOUNTER — HOSPITAL ENCOUNTER (OUTPATIENT)
Dept: NON INVASIVE DIAGNOSTICS | Facility: HOSPITAL | Age: 74
Discharge: HOME | End: 2019-02-04
Payer: MEDICARE

## 2019-02-04 ENCOUNTER — TRANSCRIBE ORDERS (OUTPATIENT)
Dept: ADMINISTRATIVE | Facility: HOSPITAL | Age: 74
End: 2019-02-04

## 2019-02-04 DIAGNOSIS — H40.1233 LOW-TENSION GLAUCOMA OF BOTH EYES, SEVERE STAGE: Primary | ICD-10-CM

## 2019-02-04 DIAGNOSIS — H40.1233 LOW-TENSION GLAUCOMA OF BOTH EYES, SEVERE STAGE: ICD-10-CM

## 2019-02-04 LAB
ATRIAL RATE: 65 BPM
P AXIS: 63 DEGREES
PR INTERVAL: 138 MS
QRS AXIS: 67 DEGREES
QRSD INTERVAL: 82 MS
QT INTERVAL: 400 MS
QTC INTERVAL: 416 MS
T WAVE AXIS: 67 DEGREES
VENTRICULAR RATE: 65 BPM

## 2019-02-04 PROCEDURE — 93010 ELECTROCARDIOGRAM REPORT: CPT | Performed by: INTERNAL MEDICINE

## 2019-02-04 PROCEDURE — 93005 ELECTROCARDIOGRAM TRACING: CPT

## 2019-02-05 ENCOUNTER — CONSULT (OUTPATIENT)
Dept: INTERNAL MEDICINE CLINIC | Facility: CLINIC | Age: 74
End: 2019-02-05
Payer: MEDICARE

## 2019-02-05 VITALS
TEMPERATURE: 97.8 F | SYSTOLIC BLOOD PRESSURE: 122 MMHG | BODY MASS INDEX: 22.24 KG/M2 | HEIGHT: 63 IN | HEART RATE: 65 BPM | WEIGHT: 125.5 LBS | DIASTOLIC BLOOD PRESSURE: 70 MMHG | OXYGEN SATURATION: 97 %

## 2019-02-05 DIAGNOSIS — H54.62 VISUAL LOSS, LEFT EYE: ICD-10-CM

## 2019-02-05 DIAGNOSIS — H40.9 GLAUCOMA OF BOTH EYES, UNSPECIFIED GLAUCOMA TYPE: Primary | ICD-10-CM

## 2019-02-05 PROCEDURE — 99215 OFFICE O/P EST HI 40 MIN: CPT | Performed by: INTERNAL MEDICINE

## 2019-02-05 RX ORDER — PREDNISOLONE ACETATE 10 MG/ML
SUSPENSION/ DROPS OPHTHALMIC
Refills: 0 | COMMUNITY
Start: 2019-01-31 | End: 2019-10-23 | Stop reason: ALTCHOICE

## 2019-02-05 RX ORDER — OFLOXACIN 3 MG/ML
SOLUTION/ DROPS OPHTHALMIC
Refills: 0 | COMMUNITY
Start: 2019-01-31 | End: 2019-10-23 | Stop reason: ALTCHOICE

## 2019-02-05 NOTE — PROGRESS NOTES
Assessment/Plan:         Diagnoses and all orders for this visit:    Glaucoma of both eyes, unspecified glaucoma type  Patient stable for eye surgery 2/1//19 at [facility]  Ekg reviewed and normal    Visual loss, left eye  Patient hopeful procedure will maintain present status and keep pressure stable     Other orders  -     timolol (TIMOPTIC) 0 5 % ophthalmic solution; instill 1 drop into both eyes every morning  -     prednisoLONE acetate (PRED FORTE) 1 % ophthalmic suspension; INSTILL 1 DROP INTO THE OPERATIVE EYE 4 TIMES A DAY STARTING 1 DAY PRIOR TO SURGERY  -     ofloxacin (OCUFLOX) 0 3 % ophthalmic solution; INSTILL 1 DROP INTO THE OPERATIVE EYE 4 TIMES A DAY STARTING 1 DAY PRIOR TO SURGERY           Patient ID: Lina Oviedo is a 75 y o  female  HPI   Patient scheduled for surgery on 2/11/19 by [facility]  She has glaucoma in both eyes and drops have not been effective  No chest pain or sob and no recent illnesses  Review of Systems   Constitutional: Negative  HENT: Negative  Eyes: Positive for redness and visual disturbance  Respiratory: Negative  Cardiovascular: Negative  Gastrointestinal: Negative  Genitourinary: Negative  Musculoskeletal: Negative  Skin: Negative  Neurological: Negative  Hematological: Negative  Psychiatric/Behavioral: Negative        Past Medical History:   Diagnosis Date    Anemia     pernicious    Anxiety     Cancer [facility])     colon, Last Assessed:  10/27/15    Depression     Disease of thyroid gland     GERD (gastroesophageal reflux disease)     Hypercholesterolemia     Hyperlipidemia     Migraine     Osteoporosis      Past Surgical History:   Procedure Laterality Date    APPENDECTOMY      BREAST SURGERY Right     cyst    COLON SURGERY  1998    hemicolectomy    HYSTERECTOMY      MOHS SURGERY      head    OOPHORECTOMY      [place] COLONOSCOPY FLX DX W/COLLJ SPEC WHEN PFRMD N/A 3/18/2016 Procedure: COLONOSCOPY;  Surgeon: Tyler Hartley MD;  [facility];  Service: Gastroenterology    [place] ESOPHAGOGASTRODUODENOSCOPY TRANSORAL DIAGNOSTIC N/A 3/18/2016    Procedure: ESOPHAGOGASTRODUODENOSCOPY (EGD); Surgeon: Tyler Hartley MD;  [facility];  Service: Gastroenterology     Social History     Social History    Marital status: /Civil Union     Spouse name: N/A    Number of children: N/A    Years of education: N/A     Occupational History    Not on file  Social History Main Topics    Smoking status: Never Smoker    Smokeless tobacco: Never Used    Alcohol use Yes      Comment: social    Drug use: No    Sexual activity: Not on file     Other Topics Concern    Not on file     Social History Narrative    Always uses sunscreen    Caffeine use    Dental care, regularly    Lives independently with spouse    Uses safety equipment - seatbelts     Allergies   Allergen Reactions    Alprazolam     Avelox [Moxifloxacin]     Demerol [Meperidine]     Erythromycin     Lipitor [Atorvastatin] Diarrhea    Shellfish-Derived Products            /70   Pulse 65   Temp 97 8 °F (36 6 °C) (Tympanic)   Ht 5' 3" (1 6 m)   Wt 56 9 kg (125 lb 8 oz)   SpO2 97%   BMI 22 23 kg/m²          Physical Exam   Constitutional: She is oriented to person, place, and time  She appears well-developed and well-nourished  No distress  HENT:   Head: Normocephalic and atraumatic  Right Ear: External ear normal    Left Ear: External ear normal    Mouth/Throat: Oropharynx is clear and moist  No oropharyngeal exudate  Eyes: Conjunctivae and EOM are normal  No scleral icterus  Neck: Normal range of motion  Neck supple  No JVD present  Cardiovascular: Normal rate, regular rhythm, normal heart sounds and intact distal pulses  No murmur heard  Pulmonary/Chest: Effort normal and breath sounds normal  No respiratory distress  She has no wheezes  She has no rales  Abdominal: Soft   Bowel sounds are normal    Musculoskeletal: Normal range of motion  She exhibits no edema  Lymphadenopathy:     She has no cervical adenopathy  Neurological: She is alert and oriented to person, place, and time  She has normal reflexes  She displays normal reflexes  No cranial nerve deficit  She exhibits abnormal muscle tone  Coordination normal    Skin: Skin is warm and dry  She is not diaphoretic  Psychiatric: She has a normal mood and affect  Her behavior is normal  Judgment and thought content normal    Nursing note and vitals reviewed

## 2019-02-06 ENCOUNTER — TELEPHONE (OUTPATIENT)
Dept: INTERNAL MEDICINE CLINIC | Facility: CLINIC | Age: 74
End: 2019-02-06

## 2019-02-07 DIAGNOSIS — R53.83 FATIGUE, UNSPECIFIED TYPE: Primary | ICD-10-CM

## 2019-04-04 DIAGNOSIS — Z12.31 ENCOUNTER FOR SCREENING MAMMOGRAM FOR MALIGNANT NEOPLASM OF BREAST: ICD-10-CM

## 2019-04-06 DIAGNOSIS — E03.9 ACQUIRED HYPOTHYROIDISM: ICD-10-CM

## 2019-04-06 RX ORDER — LEVOTHYROXINE SODIUM 75 MCG
TABLET ORAL
Qty: 90 TABLET | Refills: 2 | Status: SHIPPED | OUTPATIENT
Start: 2019-04-06 | End: 2019-12-10 | Stop reason: SDUPTHER

## 2019-10-23 ENCOUNTER — APPOINTMENT (OUTPATIENT)
Dept: LAB | Facility: CLINIC | Age: 74
End: 2019-10-23
Payer: MEDICARE

## 2019-10-23 ENCOUNTER — OFFICE VISIT (OUTPATIENT)
Dept: INTERNAL MEDICINE CLINIC | Facility: CLINIC | Age: 74
End: 2019-10-23
Payer: MEDICARE

## 2019-10-23 VITALS
SYSTOLIC BLOOD PRESSURE: 124 MMHG | TEMPERATURE: 97.4 F | BODY MASS INDEX: 22.59 KG/M2 | HEIGHT: 63 IN | DIASTOLIC BLOOD PRESSURE: 78 MMHG | OXYGEN SATURATION: 98 % | WEIGHT: 127.5 LBS | HEART RATE: 72 BPM

## 2019-10-23 DIAGNOSIS — R42 VERTIGO: ICD-10-CM

## 2019-10-23 DIAGNOSIS — E03.9 ACQUIRED HYPOTHYROIDISM: ICD-10-CM

## 2019-10-23 DIAGNOSIS — G43.009 MIGRAINE WITHOUT AURA AND WITHOUT STATUS MIGRAINOSUS, NOT INTRACTABLE: ICD-10-CM

## 2019-10-23 DIAGNOSIS — E78.2 MIXED HYPERLIPIDEMIA: Primary | ICD-10-CM

## 2019-10-23 DIAGNOSIS — E78.2 MIXED HYPERLIPIDEMIA: ICD-10-CM

## 2019-10-23 DIAGNOSIS — Z23 FLU VACCINE NEED: ICD-10-CM

## 2019-10-23 PROBLEM — G44.86 CERVICOGENIC HEADACHE: Status: RESOLVED | Noted: 2017-10-17 | Resolved: 2019-10-23

## 2019-10-23 PROBLEM — R00.1 BRADYCARDIA: Status: RESOLVED | Noted: 2017-08-30 | Resolved: 2019-10-23

## 2019-10-23 PROBLEM — R07.89 ATYPICAL CHEST PAIN: Status: RESOLVED | Noted: 2017-02-24 | Resolved: 2019-10-23

## 2019-10-23 PROBLEM — B37.0 ORAL THRUSH: Status: RESOLVED | Noted: 2018-04-23 | Resolved: 2019-10-23

## 2019-10-23 PROBLEM — R53.83 FATIGUE: Status: RESOLVED | Noted: 2018-08-22 | Resolved: 2019-10-23

## 2019-10-23 LAB
ALBUMIN SERPL BCP-MCNC: 3.6 G/DL (ref 3.5–5)
ALP SERPL-CCNC: 89 U/L (ref 46–116)
ALT SERPL W P-5'-P-CCNC: 19 U/L (ref 12–78)
ANION GAP SERPL CALCULATED.3IONS-SCNC: 5 MMOL/L (ref 4–13)
AST SERPL W P-5'-P-CCNC: 11 U/L (ref 5–45)
BILIRUB SERPL-MCNC: 0.5 MG/DL (ref 0.2–1)
BUN SERPL-MCNC: 12 MG/DL (ref 5–25)
CALCIUM SERPL-MCNC: 9.2 MG/DL (ref 8.3–10.1)
CHLORIDE SERPL-SCNC: 108 MMOL/L (ref 100–108)
CO2 SERPL-SCNC: 28 MMOL/L (ref 21–32)
CREAT SERPL-MCNC: 0.65 MG/DL (ref 0.6–1.3)
GFR SERPL CREATININE-BSD FRML MDRD: 87 ML/MIN/1.73SQ M
GLUCOSE P FAST SERPL-MCNC: 89 MG/DL (ref 65–99)
LDLC SERPL DIRECT ASSAY-MCNC: 166 MG/DL (ref 0–100)
POTASSIUM SERPL-SCNC: 4 MMOL/L (ref 3.5–5.3)
PROT SERPL-MCNC: 6.6 G/DL (ref 6.4–8.2)
SODIUM SERPL-SCNC: 141 MMOL/L (ref 136–145)
TSH SERPL DL<=0.05 MIU/L-ACNC: 0.52 UIU/ML (ref 0.36–3.74)

## 2019-10-23 PROCEDURE — 83721 ASSAY OF BLOOD LIPOPROTEIN: CPT

## 2019-10-23 PROCEDURE — 36415 COLL VENOUS BLD VENIPUNCTURE: CPT

## 2019-10-23 PROCEDURE — 80053 COMPREHEN METABOLIC PANEL: CPT

## 2019-10-23 PROCEDURE — 84443 ASSAY THYROID STIM HORMONE: CPT

## 2019-10-23 PROCEDURE — 90662 IIV NO PRSV INCREASED AG IM: CPT

## 2019-10-23 PROCEDURE — 99214 OFFICE O/P EST MOD 30 MIN: CPT | Performed by: INTERNAL MEDICINE

## 2019-10-23 PROCEDURE — G0008 ADMIN INFLUENZA VIRUS VAC: HCPCS

## 2019-10-23 RX ORDER — MECLIZINE HCL 12.5 MG/1
12.5 TABLET ORAL EVERY 8 HOURS PRN
Qty: 30 TABLET | Refills: 2 | Status: SHIPPED | OUTPATIENT
Start: 2019-10-23 | End: 2021-09-07 | Stop reason: SDUPTHER

## 2019-10-23 RX ORDER — NETARSUDIL 0.2 MG/ML
SOLUTION/ DROPS OPHTHALMIC; TOPICAL
Refills: 0 | COMMUNITY
Start: 2019-09-30 | End: 2021-10-15 | Stop reason: ALTCHOICE

## 2019-10-23 NOTE — PROGRESS NOTES
Assessment/Plan:         Diagnoses and all orders for this visit:    Mixed hyperlipidemia  -     LDL cholesterol, direct; Future  -     Comprehensive metabolic panel; Future  Going today for labs Low fat diet Continue exercise    Flu vaccine need  -     influenza vaccine, [id number], high-dose, PF 0 5 mL (FLUZONE HIGH-DOSE)  Flu shot today    Migraine without aura and without status migrainosus, not intractable  Headaches much better since eye sxs are better She has continued ophtho followup    Acquired hypothyroidism  -     TSH, 3rd generation with Free T4 reflex; Future  Recheck level today    Other orders  -     RHOPRESSA 0 02 % SOLN      Rto 6 months/wellness     Patient ID: Davon Sanchez is a 75 y o  female  HPI   Pt generally ok Her headaches and eye sxs are much better after procedure and with present eye drop regimen Eye pressures are much more stable and vision improved She is walking somewhat regularly for exercise and tolerating Has been travelling a lot the past few weeks No chest pain or sob Some right sided acute muscular pain that increases when she moves her shoulder - no known trauma  Not triggering headache Occasional dizziness but not often and not cyclic  Bowels are stable and no abdominal pain She does state that her diet is not well controlled and is concerned about her cholesterol - she presently is off statin as she has been unable to toelrate multiple versions/dosing        Review of Systems   Constitutional: Negative  HENT: Negative  Respiratory: Negative  Cardiovascular: Negative  Gastrointestinal: Negative  Genitourinary: Negative  Musculoskeletal: Negative  Skin: Negative  Neurological: Negative  Hematological: Negative  Psychiatric/Behavioral: Negative          Past Medical History:   Diagnosis Date    Anemia     pernicious    Anxiety     Cancer [facility])     colon, Last Assessed:  10/27/15    Depression     Disease of thyroid gland     GERD (gastroesophageal reflux disease)     Hypercholesterolemia     Hyperlipidemia     Migraine     Osteoporosis      Past Surgical History:   Procedure Laterality Date    APPENDECTOMY      BREAST SURGERY Right     cyst    COLON SURGERY  1998    hemicolectomy    HYSTERECTOMY      MOHS SURGERY      head    OOPHORECTOMY      AL COLONOSCOPY FLX DX W/COLLJ SPEC WHEN PFRMD N/A 3/18/2016    Procedure: COLONOSCOPY;  Surgeon: Desirae Reese MD;  [facility];  Service: Gastroenterology    AL ESOPHAGOGASTRODUODENOSCOPY TRANSORAL DIAGNOSTIC N/A 3/18/2016    Procedure: ESOPHAGOGASTRODUODENOSCOPY (EGD);   Surgeon: Desirae Reese MD;  [facility];  Service: Gastroenterology     Social History     Socioeconomic History    Marital status: /Civil Union     Spouse name: Not on file    Number of children: Not on file    Years of education: Not on file    Highest education level: Not on file   Occupational History    Not on file   Social Needs    Financial resource strain: Not on file    Food insecurity:     Worry: Not on file     Inability: Not on file    Transportation needs:     Medical: Not on file     Non-medical: Not on file   Tobacco Use    Smoking status: Never Smoker    Smokeless tobacco: Never Used   Substance and Sexual Activity    Alcohol use: Yes     Comment: social    Drug use: No    Sexual activity: Not on file   Lifestyle    Physical activity:     Days per week: Not on file     Minutes per session: Not on file    Stress: Not on file   Relationships    Social connections:     Talks on phone: Not on file     Gets together: Not on file     Attends [employer] service: Not on file     Active member of club or organization: Not on file     Attends meetings of clubs or organizations: Not on file     Relationship status: Not on file    Intimate partner violence:     Fear of current or ex partner: Not on file     Emotionally abused: Not on file     Physically abused: Not on file Forced sexual activity: Not on file   Other Topics Concern    Not on file   Social History Narrative    Always uses sunscreen    Caffeine use    Dental care, regularly    Lives independently with spouse    Uses safety equipment - seatbelts     Allergies   Allergen Reactions    Alprazolam     Avelox [Moxifloxacin]     Demerol [Meperidine]     Erythromycin     Lipitor [Atorvastatin] Diarrhea    Shellfish-Derived Products            /78   Pulse 72   Temp (!) 97 4 °F (36 3 °C) (Tympanic)   Ht 5' 3" (1 6 m)   Wt 57 8 kg (127 lb 8 oz)   SpO2 98%   BMI 22 59 kg/m²          Physical Exam   Constitutional: She is oriented to person, place, and time  She appears well-developed and well-nourished  No distress  HENT:   Head: Normocephalic and atraumatic  Mouth/Throat: Oropharynx is clear and moist  No oropharyngeal exudate  Eyes: Pupils are equal, round, and reactive to light  Conjunctivae and EOM are normal  No scleral icterus  Neck: Normal range of motion  Neck supple  No JVD present  Cardiovascular: Normal rate and regular rhythm  No murmur heard  Pulmonary/Chest: Effort normal and breath sounds normal  No respiratory distress  She has no wheezes  Abdominal: Soft  Bowel sounds are normal  She exhibits no distension  There is no tenderness  Musculoskeletal: Normal range of motion  She exhibits no edema  Lymphadenopathy:     She has no cervical adenopathy  Neurological: She is alert and oriented to person, place, and time  Skin: Skin is warm and dry  Capillary refill takes less than 2 seconds  She is not diaphoretic  No erythema  Psychiatric: She has a normal mood and affect  Her behavior is normal  Judgment and thought content normal    Nursing note and vitals reviewed

## 2019-10-24 ENCOUNTER — TELEPHONE (OUTPATIENT)
Dept: INTERNAL MEDICINE CLINIC | Facility: CLINIC | Age: 74
End: 2019-10-24

## 2019-10-24 DIAGNOSIS — E78.00 ELEVATED LDL CHOLESTEROL LEVEL: Primary | ICD-10-CM

## 2019-11-20 DIAGNOSIS — M62.830 SPASM OF MUSCLE OF LOWER BACK: Primary | ICD-10-CM

## 2019-11-20 RX ORDER — CYCLOBENZAPRINE HCL 10 MG
10 TABLET ORAL EVERY 12 HOURS PRN
Qty: 30 TABLET | Refills: 0 | Status: SHIPPED | OUTPATIENT
Start: 2019-11-20 | End: 2020-11-27 | Stop reason: SDUPTHER

## 2019-11-20 NOTE — TELEPHONE ENCOUNTER
Pt is c/o lower back pain and spasms  Pt is requesting flexeril, states that it has helped her in the past  Pls advise        Multiple med allergies, pls see chart

## 2019-12-10 DIAGNOSIS — E03.9 ACQUIRED HYPOTHYROIDISM: ICD-10-CM

## 2019-12-10 RX ORDER — LEVOTHYROXINE SODIUM 75 MCG
TABLET ORAL
Qty: 90 TABLET | Refills: 0 | Status: SHIPPED | OUTPATIENT
Start: 2019-12-10 | End: 2020-03-02

## 2020-02-25 DIAGNOSIS — R05.9 COUGH: Primary | ICD-10-CM

## 2020-02-25 RX ORDER — PROMETHAZINE HYDROCHLORIDE AND CODEINE PHOSPHATE 6.25; 1 MG/5ML; MG/5ML
5 SYRUP ORAL
Qty: 120 ML | Refills: 0 | Status: SHIPPED | OUTPATIENT
Start: 2020-02-25 | End: 2020-09-16 | Stop reason: ALTCHOICE

## 2020-02-25 NOTE — TELEPHONE ENCOUNTER
Pt called asking for cough medicine to be sent in for cough as she is coughing at night and not sleeping due to a cold  She asked for phenergen with codeine to be sent into [facility] in [place]

## 2020-03-02 ENCOUNTER — OFFICE VISIT (OUTPATIENT)
Dept: INTERNAL MEDICINE CLINIC | Facility: CLINIC | Age: 75
End: 2020-03-02
Payer: MEDICARE

## 2020-03-02 VITALS
WEIGHT: 125.13 LBS | DIASTOLIC BLOOD PRESSURE: 70 MMHG | BODY MASS INDEX: 22.17 KG/M2 | SYSTOLIC BLOOD PRESSURE: 122 MMHG | HEIGHT: 63 IN

## 2020-03-02 DIAGNOSIS — E03.9 ACQUIRED HYPOTHYROIDISM: ICD-10-CM

## 2020-03-02 DIAGNOSIS — Z00.00 MEDICARE ANNUAL WELLNESS VISIT, SUBSEQUENT: Primary | ICD-10-CM

## 2020-03-02 PROCEDURE — 1160F RVW MEDS BY RX/DR IN RCRD: CPT | Performed by: INTERNAL MEDICINE

## 2020-03-02 PROCEDURE — 1125F AMNT PAIN NOTED PAIN PRSNT: CPT | Performed by: INTERNAL MEDICINE

## 2020-03-02 PROCEDURE — 3008F BODY MASS INDEX DOCD: CPT | Performed by: INTERNAL MEDICINE

## 2020-03-02 PROCEDURE — 1170F FXNL STATUS ASSESSED: CPT | Performed by: INTERNAL MEDICINE

## 2020-03-02 PROCEDURE — 1036F TOBACCO NON-USER: CPT | Performed by: INTERNAL MEDICINE

## 2020-03-02 PROCEDURE — 4040F PNEUMOC VAC/ADMIN/RCVD: CPT | Performed by: INTERNAL MEDICINE

## 2020-03-02 PROCEDURE — G0438 PPPS, INITIAL VISIT: HCPCS | Performed by: INTERNAL MEDICINE

## 2020-03-02 RX ORDER — LEVOTHYROXINE SODIUM 0.05 MG/1
50 TABLET ORAL
Qty: 90 TABLET | Refills: 3 | Status: SHIPPED | OUTPATIENT
Start: 2020-03-02 | End: 2020-06-01

## 2020-03-02 RX ORDER — ROSUVASTATIN CALCIUM 5 MG/1
5 TABLET, COATED ORAL DAILY
COMMUNITY
Start: 2020-02-05 | End: 2021-02-04 | Stop reason: ALTCHOICE

## 2020-03-02 NOTE — PROGRESS NOTES
Assessment and Plan:     Problem List Items Addressed This Visit        Other    Medicare annual wellness visit, subsequent - Primary      Decrease thyroid rx to 50mcg daily  Continue current rx  Eye exam utd  Walking program for exercise  Rto 6 months      Preventive health issues were discussed with patient, and age appropriate screening tests were ordered as noted in patient's After Visit Summary  Personalized health advice and appropriate referrals for health education or preventive services given if needed, as noted in patient's After Visit Summary       History of Present Illness:     Patient presents for Medicare Annual Wellness visit    Patient Care Team:  Christopher Michael DO as PCP - General  DO Sneha Oviedo DO Daryl Graeme Dills, MD Phebe Gola, MD as Endoscopist     Problem List:     Patient Active Problem List   Diagnosis    Allodynia    Depression with anxiety    Esophageal reflux    Glaucoma    Hyperlipidemia    Hypothyroidism    Irritable bowel syndrome    Lumbar disc displacement without myelopathy    Migraine, unspecified, not intractable, without status migrainosus    Osteoporosis    Pernicious anemia    Medicare annual wellness visit, subsequent    Glaucoma of both eyes    Visual loss, left eye    Flu vaccine need      Past Medical and Surgical History:     Past Medical History:   Diagnosis Date    Anemia     pernicious    Anxiety     Cancer ([address] )     colon, Last Assessed:  10/27/15    Depression     Disease of thyroid gland     GERD (gastroesophageal reflux disease)     Hypercholesterolemia     Hyperlipidemia     Migraine     Osteoporosis      Past Surgical History:   Procedure Laterality Date    APPENDECTOMY      BREAST SURGERY Right     cyst    COLON SURGERY  1998    hemicolectomy    HYSTERECTOMY      MOHS SURGERY      head    OOPHORECTOMY      SD COLONOSCOPY FLX DX W/COLLJ SPEC WHEN PFRMD N/A 3/18/2016    Procedure: COLONOSCOPY;  Surgeon: Mitzi Hanson MD;  Location: [facility];  Service: Gastroenterology    CO ESOPHAGOGASTRODUODENOSCOPY TRANSORAL DIAGNOSTIC N/A 3/18/2016    Procedure: ESOPHAGOGASTRODUODENOSCOPY (EGD);   Surgeon: Mitzi Hanson MD;  Location: [facility];  Service: Gastroenterology      Family History:     Family History   Problem Relation Age of Onset    Coronary artery disease Mother     Colon cancer Mother     Lung cancer Mother     Other Mother         Stroke syndrome    Throat cancer Father     Breast cancer Family     Coronary artery disease Family       Social History:     E-Cigarette/Vaping    E-Cigarette Use Never User      E-Cigarette/Vaping Substances    Nicotine No     THC No     CBD No     Flavoring No     Other No     Unknown No      Social History     Socioeconomic History    Marital status: /Civil Union     Spouse name: None    Number of children: None    Years of education: None    Highest education level: None   Occupational History    None   Social Needs    Financial resource strain: None    Food insecurity:     Worry: None     Inability: None    Transportation needs:     Medical: None     Non-medical: None   Tobacco Use    Smoking status: Never Smoker    Smokeless tobacco: Never Used   Substance and Sexual Activity    Alcohol use: Yes     Comment: social    Drug use: No    Sexual activity: None   Lifestyle    Physical activity:     Days per week: None     Minutes per session: None    Stress: None   Relationships    Social connections:     Talks on phone: None     Gets together: None     Attends Quaker service: None     Active member of club or organization: None     Attends meetings of clubs or organizations: None     Relationship status: None    Intimate partner violence:     Fear of current or ex partner: None     Emotionally abused: None     Physically abused: None     Forced sexual activity: None   Other Topics Concern    None   Social History Narrative    Always uses sunscreen    Caffeine use    Dental care, regularly    Lives independently with spouse    Uses safety equipment - seatbelts      Medications and Allergies:     Current Outpatient Medications   Medication Sig Dispense Refill    AZOPT 1 % ophthalmic suspension       Butalbital-APAP-Caffeine -40 MG CAPS Take by mouth      cyclobenzaprine (FLEXERIL) 10 mg tablet Take 1 tablet (10 mg total) by mouth every 12 (twelve) hours as needed for muscle spasms 30 tablet 0    meclizine (ANTIVERT) 12 5 MG tablet Take 1 tablet (12 5 mg total) by mouth every 8 (eight) hours as needed for dizziness 30 tablet 2    promethazine-codeine (PHENERGAN WITH CODEINE) 6 25-10 mg/5 mL syrup Take 5 mL by mouth daily at bedtime as needed for cough 120 mL 0    RHOPRESSA 0 02 % SOLN   0    rosuvastatin (CRESTOR) 5 mg tablet Take 5 mg by mouth daily      SYNTHROID 75 MCG tablet take 1 tablet by mouth once daily 90 tablet 0    timolol (TIMOPTIC) 0 5 % ophthalmic solution Administer to the right eye   0     No current facility-administered medications for this visit        Allergies   Allergen Reactions    Alprazolam     Avelox [Moxifloxacin]     Demerol [Meperidine]     Erythromycin     Lipitor [Atorvastatin] Diarrhea    Shellfish-Derived Products       Immunizations:     Immunization History   Administered Date(s) Administered    INFLUENZA 01/01/2008    Influenza Quadrivalent Preservative Free 3 years and older IM 10/27/2015    Influenza Split High Dose Preservative Free IM 08/30/2017    Influenza TIV (IM) 11/15/2013, 10/23/2014, 12/08/2016    Influenza, high dose seasonal 0 5 mL 11/06/2018, 10/23/2019    Pneumococcal Conjugate 13-Valent 01/08/2019    Pneumococcal Polysaccharide PPV23 01/01/2007    Td (adult), adsorbed 01/01/2000    Zoster 12/17/2009      Health Maintenance:         Topic Date Due    CRC Screening: Colonoscopy  01/22/2024         Topic Date Due    DTaP,Tdap,and Td Vaccines (1 - Tdap) 06/08/1956    Pneumococcal Vaccine: 65+ Years (2 of 2 - PPSV23) 01/08/2020      Medicare Health Risk Assessment:     /70   Ht 5' 3" (1 6 m)   Wt 56 8 kg (125 lb 2 oz)   BMI 22 16 kg/m²      Kayley Tyson is here for her Subsequent Wellness visit  Last Medicare Wellness visit information reviewed, patient interviewed and updates made to the record today  Health Risk Assessment:   Patient rates overall health as very good  Patient feels that their physical health rating is same  Eyesight was rated as slightly worse  Hearing was rated as same  Patient feels that their emotional and mental health rating is same  Pain experienced in the last 7 days has been some  Patient's pain rating has been 6/10  Patient states that she has experienced no weight loss or gain in last 6 months  Depression Screening:   PHQ-2 Score: 0  PHQ-9 Score: 0      Fall Risk Screening: In the past year, patient has experienced: no history of falling in past year      Urinary Incontinence Screening:   Patient has not leaked urine accidently in the last six months  Home Safety:  Patient does not have trouble with stairs inside or outside of their home  Patient has working smoke alarms and has working carbon monoxide detector  Home safety hazards include: none  Nutrition:   Current diet is Regular  Medications:   Patient is currently taking over-the-counter supplements  OTC medications include: see medication list  Patient is able to manage medications  Activities of Daily Living (ADLs)/Instrumental Activities of Daily Living (IADLs):   Walk and transfer into and out of bed and chair?: Yes  Dress and groom yourself?: Yes    Bathe or shower yourself?: Yes    Feed yourself?  Yes  Do your laundry/housekeeping?: Yes  Manage your money, pay your bills and track your expenses?: Yes  Make your own meals?: Yes    Do your own shopping?: Yes    Previous Hospitalizations:   Any hospitalizations or ED visits within the last 12 months?: No      Advance Care Planning:   Living will: Yes    Durable POA for healthcare: Yes    Advanced directive: Yes    End of Life Decisions reviewed with patient: Yes    Provider agrees with end of life decisions: Yes      Cognitive Screening:   Provider or family/friend/caregiver concerned regarding cognition?: No    PREVENTIVE SCREENINGS      Cardiovascular Screening:    General: Screening Not Indicated, History Lipid Disorder and Risks and Benefits Discussed      Diabetes Screening:     General: Screening Current      Colorectal Cancer Screening:     General: Screening Current      Breast Cancer Screening:     General: Screening Current      Cervical Cancer Screening:    General: Screening Not Indicated      Osteoporosis Screening:    General: Screening Not Indicated and History Osteoporosis      Abdominal Aortic Aneurysm (AAA) Screening:        General: Screening Not Indicated      Lung Cancer Screening:     General: Screening Not Indicated      Hepatitis C Screening:    General: Risks and Benefits Discussed    Hep C Screening Accepted: Yes      Other Counseling Topics:   Calcium and vitamin D intake         Robe Pandya DO

## 2020-04-22 ENCOUNTER — TELEPHONE (OUTPATIENT)
Dept: INTERNAL MEDICINE CLINIC | Facility: CLINIC | Age: 75
End: 2020-04-22

## 2020-04-23 ENCOUNTER — TRANSCRIBE ORDERS (OUTPATIENT)
Dept: LAB | Facility: MEDICAL CENTER | Age: 75
End: 2020-04-23

## 2020-04-23 ENCOUNTER — APPOINTMENT (OUTPATIENT)
Dept: LAB | Facility: MEDICAL CENTER | Age: 75
End: 2020-04-23
Payer: MEDICARE

## 2020-04-23 DIAGNOSIS — R00.2 PALPITATIONS: ICD-10-CM

## 2020-04-23 DIAGNOSIS — I10 ESSENTIAL HYPERTENSION, BENIGN: ICD-10-CM

## 2020-04-23 DIAGNOSIS — E03.9 HYPOTHYROIDISM, UNSPECIFIED TYPE: ICD-10-CM

## 2020-04-23 DIAGNOSIS — E78.49 OTHER HYPERLIPIDEMIA: ICD-10-CM

## 2020-04-23 DIAGNOSIS — Z76.89 ENCOUNTER TO ESTABLISH CARE WITH NEW DOCTOR: ICD-10-CM

## 2020-04-23 DIAGNOSIS — E03.9 ACQUIRED HYPOTHYROIDISM: ICD-10-CM

## 2020-04-23 DIAGNOSIS — Z76.89 ENCOUNTER TO ESTABLISH CARE WITH NEW DOCTOR: Primary | ICD-10-CM

## 2020-04-23 LAB
CHOLEST SERPL-MCNC: 181 MG/DL (ref 50–200)
HDLC SERPL-MCNC: 69 MG/DL
LDLC SERPL CALC-MCNC: 97 MG/DL (ref 0–100)
NONHDLC SERPL-MCNC: 112 MG/DL
T4 FREE SERPL-MCNC: 1.03 NG/DL (ref 0.76–1.46)
TRIGL SERPL-MCNC: 75 MG/DL
TSH SERPL DL<=0.05 MIU/L-ACNC: 3.96 UIU/ML (ref 0.36–3.74)

## 2020-04-23 PROCEDURE — 36415 COLL VENOUS BLD VENIPUNCTURE: CPT

## 2020-04-23 PROCEDURE — 84439 ASSAY OF FREE THYROXINE: CPT

## 2020-04-23 PROCEDURE — 80061 LIPID PANEL: CPT

## 2020-04-23 PROCEDURE — 84443 ASSAY THYROID STIM HORMONE: CPT

## 2020-04-27 ENCOUNTER — DOCUMENTATION (OUTPATIENT)
Dept: INTERNAL MEDICINE CLINIC | Facility: CLINIC | Age: 75
End: 2020-04-27

## 2020-05-11 ENCOUNTER — TELEMEDICINE (OUTPATIENT)
Dept: INTERNAL MEDICINE CLINIC | Facility: CLINIC | Age: 75
End: 2020-05-11
Payer: MEDICARE

## 2020-05-11 DIAGNOSIS — E03.9 ACQUIRED HYPOTHYROIDISM: Primary | ICD-10-CM

## 2020-05-11 DIAGNOSIS — H40.9 GLAUCOMA, UNSPECIFIED GLAUCOMA TYPE, UNSPECIFIED LATERALITY: ICD-10-CM

## 2020-05-11 DIAGNOSIS — E78.2 MIXED HYPERLIPIDEMIA: ICD-10-CM

## 2020-05-11 DIAGNOSIS — L65.9 HAIR LOSS DISORDER: ICD-10-CM

## 2020-05-11 PROCEDURE — 99214 OFFICE O/P EST MOD 30 MIN: CPT | Performed by: INTERNAL MEDICINE

## 2020-06-01 DIAGNOSIS — E03.9 ACQUIRED HYPOTHYROIDISM: ICD-10-CM

## 2020-06-01 RX ORDER — LEVOTHYROXINE SODIUM 0.07 MG/1
75 TABLET ORAL DAILY
Qty: 90 TABLET | Refills: 3 | Status: SHIPPED | OUTPATIENT
Start: 2020-06-01 | End: 2020-11-27 | Stop reason: DRUGHIGH

## 2020-09-16 ENCOUNTER — OFFICE VISIT (OUTPATIENT)
Dept: INTERNAL MEDICINE CLINIC | Facility: CLINIC | Age: 75
End: 2020-09-16
Payer: MEDICARE

## 2020-09-16 VITALS
HEIGHT: 63 IN | BODY MASS INDEX: 22.19 KG/M2 | SYSTOLIC BLOOD PRESSURE: 124 MMHG | WEIGHT: 125.25 LBS | DIASTOLIC BLOOD PRESSURE: 78 MMHG | OXYGEN SATURATION: 98 % | TEMPERATURE: 97.4 F | HEART RATE: 71 BPM

## 2020-09-16 DIAGNOSIS — E78.2 MIXED HYPERLIPIDEMIA: ICD-10-CM

## 2020-09-16 DIAGNOSIS — G43.009 MIGRAINE WITHOUT AURA AND WITHOUT STATUS MIGRAINOSUS, NOT INTRACTABLE: ICD-10-CM

## 2020-09-16 DIAGNOSIS — L30.9 DERMATITIS: ICD-10-CM

## 2020-09-16 DIAGNOSIS — E03.9 ACQUIRED HYPOTHYROIDISM: Primary | ICD-10-CM

## 2020-09-16 PROCEDURE — 99214 OFFICE O/P EST MOD 30 MIN: CPT | Performed by: INTERNAL MEDICINE

## 2020-09-16 RX ORDER — TRIAMCINOLONE ACETONIDE 1 MG/G
CREAM TOPICAL 2 TIMES DAILY
Qty: 30 G | Refills: 0 | Status: SHIPPED | OUTPATIENT
Start: 2020-09-16 | End: 2021-10-15 | Stop reason: ALTCHOICE

## 2020-09-16 NOTE — PROGRESS NOTES
Assessment/Plan:         Diagnoses and all orders for this visit:    Acquired hypothyroidism  Pt will get repeat TSH prior to upcoming flu shot she scheduled for October    Dermatitis  -     triamcinolone (KENALOG) 0 1 % cream; Apply topically 2 (two) times a day  Trial this on area above right eyelid If improves would change to daily moisturizer if not may need derm reeval    Migraine without aura and without status migrainosus, not intractable  Sxs fairly stable Increase hydration Tylenol prn    Mixed hyperlipidemia  Continue statin Labs stable on recent testing     Rto 6 months        Patient ID: Luisa Chapman is a 67 y o  female  HPI  Pt generally well She walks 3/week No chest pain or sob her vision has stable and she had recent reeval No falls No signficant pain She misses seeing her family due to covid She only goes for walks and to the grocery store  No sinus sxs of concern surprisingly this time of year  No change in bowels or bladder function - pt had colonoscopy recently and stated that will be her last one, no issues reported  She has a dry patch above her right eyelid that derm is aware of and was to give her a cream for but did not it is of concern to the patient       Review of Systems   Constitutional: Negative  HENT: Negative for congestion, postnasal drip and rhinorrhea  Dry patch above right eyebrow   Respiratory: Negative for cough and shortness of breath  Cardiovascular: Negative for chest pain and leg swelling  Gastrointestinal: Negative for abdominal distention and abdominal pain  Genitourinary: Negative  Musculoskeletal: Negative for arthralgias and gait problem  Skin: Negative for color change and pallor  Neurological: Negative for dizziness, light-headedness and headaches  Hematological: Negative  Psychiatric/Behavioral: Negative for sleep disturbance  The patient is not nervous/anxious        Skin lesion near her nose has grew and she is looking into removal - it is benign lesion  Past Medical History:   Diagnosis Date    Anemia     pernicious    Anxiety     Cancer ([address] )     colon, Last Assessed:  10/27/15    Depression     Disease of thyroid gland     GERD (gastroesophageal reflux disease)     Hypercholesterolemia     Hyperlipidemia     Migraine     Osteoporosis      Past Surgical History:   Procedure Laterality Date    APPENDECTOMY      BREAST SURGERY Right     cyst    COLON SURGERY  1998    hemicolectomy    HYSTERECTOMY      MOHS SURGERY      head    OOPHORECTOMY      SC COLONOSCOPY FLX DX W/COLLJ SPEC WHEN PFRMD N/A 3/18/2016    Procedure: COLONOSCOPY;  Surgeon: Venita Looney MD;  [facility]: [facility];  Service: Gastroenterology    SC ESOPHAGOGASTRODUODENOSCOPY TRANSORAL DIAGNOSTIC N/A 3/18/2016    Procedure: ESOPHAGOGASTRODUODENOSCOPY (EGD);   Surgeon: Venita Looney MD;  [facility];  Service: Gastroenterology    TONSILLECTOMY  childhood     Social History     Socioeconomic History    Marital status: /Civil Union     Spouse name: Not on file    Number of children: Not on file    Years of education: Not on file    Highest education level: Not on file   Occupational History    Not on file   Social Needs    Financial resource strain: Not on file    Food insecurity     Worry: Not on file     Inability: Not on file    Transportation needs     Medical: Not on file     Non-medical: Not on file   Tobacco Use    Smoking status: Never Smoker    Smokeless tobacco: Never Used   Substance and Sexual Activity    Alcohol use: Yes     Comment: social    Drug use: No    Sexual activity: Not on file   Lifestyle    Physical activity     Days per week: Not on file     Minutes per session: Not on file    Stress: Not on file   Relationships    Social connections     Talks on phone: Not on file     Gets together: Not on file     Attends Taoist service: Not on file     Active member of club or organization: Not on file Attends meetings of clubs or organizations: Not on file     Relationship status: Not on file    Intimate partner violence     Fear of current or ex partner: Not on file     Emotionally abused: Not on file     Physically abused: Not on file     Forced sexual activity: Not on file   Other Topics Concern    Not on file   Social History Narrative    Always uses sunscreen    Caffeine use- 2-3 cups of coffee 1/2 and 1/2    Dental care, regularly    Lives independently with spouse    Uses safety equipment - seatbelts     Allergies   Allergen Reactions    Alprazolam     Avelox [Moxifloxacin]     Demerol [Meperidine]     Erythromycin     Lipitor [Atorvastatin] Diarrhea    Prednisone     Shellfish-Derived Products             /78   Pulse 71   Temp (!) 97 4 °F (36 3 °C) (Temporal)   Ht 5' 3" (1 6 m)   Wt 56 8 kg (125 lb 4 oz)   SpO2 98%   BMI 22 19 kg/m²          Physical Exam  Vitals signs reviewed  Constitutional:       General: She is not in acute distress  Appearance: Normal appearance  She is normal weight  She is not ill-appearing or toxic-appearing  HENT:      Head: Normocephalic and atraumatic  Right Ear: Tympanic membrane and external ear normal       Left Ear: Tympanic membrane and external ear normal       Nose: Nose normal  No congestion or rhinorrhea  Mouth/Throat:      Mouth: Mucous membranes are moist    Eyes:      General: No scleral icterus  Extraocular Movements: Extraocular movements intact  Conjunctiva/sclera: Conjunctivae normal       Pupils: Pupils are equal, round, and reactive to light  Neck:      Musculoskeletal: Normal range of motion and neck supple  No neck rigidity or muscular tenderness  Cardiovascular:      Rate and Rhythm: Normal rate and regular rhythm  Pulses: Normal pulses  Heart sounds: No murmur  Pulmonary:      Effort: Pulmonary effort is normal  No respiratory distress  Breath sounds: Normal breath sounds  No wheezing  Abdominal:      General: Bowel sounds are normal  There is no distension  Palpations: Abdomen is soft  Tenderness: There is no abdominal tenderness  Musculoskeletal: Normal range of motion  General: No swelling or tenderness  Skin:     General: Skin is warm and dry  Neurological:      General: No focal deficit present  Mental Status: She is alert and oriented to person, place, and time  Mental status is at baseline  Cranial Nerves: No cranial nerve deficit  Motor: No weakness  Psychiatric:         Mood and Affect: Mood normal          Behavior: Behavior normal          Thought Content:  Thought content normal          Judgment: Judgment normal

## 2020-10-08 ENCOUNTER — LAB (OUTPATIENT)
Dept: LAB | Facility: MEDICAL CENTER | Age: 75
End: 2020-10-08
Payer: MEDICARE

## 2020-10-08 ENCOUNTER — IMMUNIZATIONS (OUTPATIENT)
Dept: INTERNAL MEDICINE CLINIC | Facility: CLINIC | Age: 75
End: 2020-10-08
Payer: MEDICARE

## 2020-10-08 DIAGNOSIS — E03.9 ACQUIRED HYPOTHYROIDISM: ICD-10-CM

## 2020-10-08 DIAGNOSIS — Z23 ENCOUNTER FOR IMMUNIZATION: ICD-10-CM

## 2020-10-08 LAB
T4 FREE SERPL-MCNC: 1.48 NG/DL (ref 0.76–1.46)
TSH SERPL DL<=0.05 MIU/L-ACNC: 0.06 UIU/ML (ref 0.36–3.74)

## 2020-10-08 PROCEDURE — G0008 ADMIN INFLUENZA VIRUS VAC: HCPCS | Performed by: INTERNAL MEDICINE

## 2020-10-08 PROCEDURE — 84443 ASSAY THYROID STIM HORMONE: CPT

## 2020-10-08 PROCEDURE — 84439 ASSAY OF FREE THYROXINE: CPT

## 2020-10-08 PROCEDURE — 84479 ASSAY OF THYROID (T3 OR T4): CPT

## 2020-10-08 PROCEDURE — 36415 COLL VENOUS BLD VENIPUNCTURE: CPT

## 2020-10-08 PROCEDURE — 90662 IIV NO PRSV INCREASED AG IM: CPT | Performed by: INTERNAL MEDICINE

## 2020-10-09 ENCOUNTER — DOCUMENTATION (OUTPATIENT)
Dept: INTERNAL MEDICINE CLINIC | Facility: CLINIC | Age: 75
End: 2020-10-09

## 2020-10-09 LAB — T3RU NFR SERPL: 31 % (ref 24–39)

## 2020-11-05 DIAGNOSIS — K21.9 GASTROESOPHAGEAL REFLUX DISEASE, UNSPECIFIED WHETHER ESOPHAGITIS PRESENT: Primary | ICD-10-CM

## 2020-11-05 RX ORDER — OMEPRAZOLE 20 MG/1
20 CAPSULE, DELAYED RELEASE ORAL
Qty: 30 CAPSULE | Refills: 5 | Status: SHIPPED | OUTPATIENT
Start: 2020-11-05 | End: 2022-01-04 | Stop reason: SDUPTHER

## 2020-11-27 DIAGNOSIS — M62.830 SPASM OF MUSCLE OF LOWER BACK: ICD-10-CM

## 2020-11-27 DIAGNOSIS — E03.9 ACQUIRED HYPOTHYROIDISM: Primary | ICD-10-CM

## 2020-11-27 RX ORDER — LEVOTHYROXINE SODIUM 50 MCG
50 TABLET ORAL DAILY
Qty: 30 TABLET | Refills: 5 | Status: SHIPPED | OUTPATIENT
Start: 2020-11-27 | End: 2021-03-22 | Stop reason: SDUPTHER

## 2020-11-27 RX ORDER — CYCLOBENZAPRINE HCL 10 MG
10 TABLET ORAL EVERY 12 HOURS PRN
Qty: 30 TABLET | Refills: 0 | Status: SHIPPED | OUTPATIENT
Start: 2020-11-27 | End: 2021-10-15 | Stop reason: ALTCHOICE

## 2021-01-18 ENCOUNTER — IMMUNIZATIONS (OUTPATIENT)
Dept: FAMILY MEDICINE CLINIC | Facility: HOSPITAL | Age: 76
End: 2021-01-18

## 2021-01-18 DIAGNOSIS — Z23 ENCOUNTER FOR IMMUNIZATION: Primary | ICD-10-CM

## 2021-01-18 PROCEDURE — 0011A SARS-COV-2 / COVID-19 MRNA VACCINE (MODERNA) 100 MCG: CPT

## 2021-01-18 PROCEDURE — 91301 SARS-COV-2 / COVID-19 MRNA VACCINE (MODERNA) 100 MCG: CPT

## 2021-01-19 ENCOUNTER — TELEPHONE (OUTPATIENT)
Dept: INTERNAL MEDICINE CLINIC | Facility: CLINIC | Age: 76
End: 2021-01-19

## 2021-01-19 DIAGNOSIS — L65.9 HAIR LOSS: Primary | ICD-10-CM

## 2021-01-19 NOTE — TELEPHONE ENCOUNTER
Generally higher dose is more likely to cause lower thyroid levels resulting in hyperactivity so may be more at risk to have hair loss etc    Would recommend at least recheck tsh since has been on lower dose and if tsh not low can trial prior rx

## 2021-01-19 NOTE — TELEPHONE ENCOUNTER
Patient has c/o hair loss and eyebrows falling out  Asking if you could change her Synthroid back to what she was taking   75 mcg instead of the  50 mcg that she was lowered to?

## 2021-02-10 ENCOUNTER — LAB (OUTPATIENT)
Dept: LAB | Facility: CLINIC | Age: 76
End: 2021-02-10
Payer: MEDICARE

## 2021-02-10 DIAGNOSIS — L65.9 HAIR LOSS: ICD-10-CM

## 2021-02-10 LAB — TSH SERPL DL<=0.05 MIU/L-ACNC: 2.41 UIU/ML (ref 0.36–3.74)

## 2021-02-10 PROCEDURE — 36415 COLL VENOUS BLD VENIPUNCTURE: CPT

## 2021-02-10 PROCEDURE — 84443 ASSAY THYROID STIM HORMONE: CPT

## 2021-02-15 ENCOUNTER — IMMUNIZATIONS (OUTPATIENT)
Dept: FAMILY MEDICINE CLINIC | Facility: HOSPITAL | Age: 76
End: 2021-02-15

## 2021-02-15 DIAGNOSIS — Z23 ENCOUNTER FOR IMMUNIZATION: Primary | ICD-10-CM

## 2021-02-15 PROCEDURE — 0012A SARS-COV-2 / COVID-19 MRNA VACCINE (MODERNA) 100 MCG: CPT

## 2021-02-15 PROCEDURE — 91301 SARS-COV-2 / COVID-19 MRNA VACCINE (MODERNA) 100 MCG: CPT

## 2021-03-08 ENCOUNTER — OFFICE VISIT (OUTPATIENT)
Dept: INTERNAL MEDICINE CLINIC | Facility: CLINIC | Age: 76
End: 2021-03-08
Payer: MEDICARE

## 2021-03-08 VITALS
WEIGHT: 126.25 LBS | HEIGHT: 63 IN | TEMPERATURE: 96.4 F | DIASTOLIC BLOOD PRESSURE: 70 MMHG | SYSTOLIC BLOOD PRESSURE: 128 MMHG | BODY MASS INDEX: 22.37 KG/M2

## 2021-03-08 DIAGNOSIS — Z11.59 ENCOUNTER FOR HEPATITIS C SCREENING TEST FOR LOW RISK PATIENT: ICD-10-CM

## 2021-03-08 DIAGNOSIS — E78.2 MIXED HYPERLIPIDEMIA: ICD-10-CM

## 2021-03-08 DIAGNOSIS — Z00.00 MEDICARE ANNUAL WELLNESS VISIT, SUBSEQUENT: Primary | ICD-10-CM

## 2021-03-08 DIAGNOSIS — G47.9 SLEEP DISORDER: ICD-10-CM

## 2021-03-08 DIAGNOSIS — K58.9 IRRITABLE BOWEL SYNDROME, UNSPECIFIED TYPE: ICD-10-CM

## 2021-03-08 PROBLEM — L65.9 HAIR LOSS DISORDER: Status: RESOLVED | Noted: 2020-05-11 | Resolved: 2021-03-08

## 2021-03-08 PROCEDURE — 1123F ACP DISCUSS/DSCN MKR DOCD: CPT | Performed by: INTERNAL MEDICINE

## 2021-03-08 PROCEDURE — G0439 PPPS, SUBSEQ VISIT: HCPCS | Performed by: INTERNAL MEDICINE

## 2021-03-08 RX ORDER — TRAZODONE HYDROCHLORIDE 50 MG/1
50 TABLET ORAL
Qty: 30 TABLET | Refills: 5 | Status: SHIPPED | OUTPATIENT
Start: 2021-03-08 | End: 2021-10-15 | Stop reason: ALTCHOICE

## 2021-03-08 NOTE — PROGRESS NOTES
Assessment and Plan:     Problem List Items Addressed This Visit        Digestive    Irritable bowel syndrome       Other    Hyperlipidemia    Relevant Orders    Lipid panel    Comprehensive metabolic panel    Medicare annual wellness visit, subsequent - Primary    Relevant Orders    Lipid panel    Comprehensive metabolic panel    Sleep disorder    Relevant Medications    traZODone (DESYREL) 50 mg tablet      Other Visit Diagnoses     Encounter for hepatitis C screening test for low risk patient        Relevant Orders    Hepatitis C antibody      Rx for fbw to do before next visit  Start walking foer exercise  Moist heat to right neck area  Half tablet Trazadone at bedtime      Preventive health issues were discussed with patient, and age appropriate screening tests were ordered as noted in patient's After Visit Summary  Personalized health advice and appropriate referrals for health education or preventive services given if needed, as noted in patient's After Visit Summary       History of Present Illness:     Patient presents for Medicare Annual Wellness visit    Patient Care Team:  Tiff Doyle DO as PCP - General  Kerry Mark DO Daryl Sharol Halt, MD Gwendolyn Sevin, MD as Endoscopist     Problem List:     Patient Active Problem List   Diagnosis    Allodynia    Depression with anxiety    Esophageal reflux    Glaucoma    Hyperlipidemia    Hypothyroidism    Irritable bowel syndrome    Lumbar disc displacement without myelopathy    Migraine, unspecified, not intractable, without status migrainosus    Osteoporosis    Pernicious anemia    Medicare annual wellness visit, subsequent    Glaucoma of both eyes    Visual loss, left eye    Flu vaccine need    Dermatitis    Sleep disorder      Past Medical and Surgical History:     Past Medical History:   Diagnosis Date    Anemia     pernicious    Anxiety     Cancer ([facility] [address] )     colon, Last Assessed:  10/27/15    Depression     Disease of thyroid gland     GERD (gastroesophageal reflux disease)     Hypercholesterolemia     Hyperlipidemia     Migraine     Osteoporosis      Past Surgical History:   Procedure Laterality Date    APPENDECTOMY      BREAST SURGERY Right     cyst    COLON SURGERY  1998    hemicolectomy    HYSTERECTOMY      MOHS SURGERY      head    OOPHORECTOMY      MT COLONOSCOPY FLX DX W/COLLJ SPEC WHEN PFRMD N/A 3/18/2016    Procedure: COLONOSCOPY;  Surgeon: Genesis Christie MD;  [facility];  Service: Gastroenterology    MT ESOPHAGOGASTRODUODENOSCOPY TRANSORAL DIAGNOSTIC N/A 3/18/2016    Procedure: ESOPHAGOGASTRODUODENOSCOPY (EGD);   Surgeon: Genesis Christie MD;  [facility]: [facility];  Service: Gastroenterology    TONSILLECTOMY  childhood      Family History:     Family History   Problem Relation Age of Onset    Coronary artery disease Mother     Colon cancer Mother     Lung cancer Mother     Other Mother         Stroke syndrome    Throat cancer Father     Breast cancer Family     Coronary artery disease Family       Social History:     E-Cigarette/Vaping    E-Cigarette Use Never User      E-Cigarette/Vaping Substances    Nicotine No     THC No     CBD No     Flavoring No     Other No     Unknown No      Social History     Socioeconomic History    Marital status: /Civil Union     Spouse name: None    Number of children: None    Years of education: None    Highest education level: None   Occupational History    None   Social Needs    Financial resource strain: None    Food insecurity     Worry: None     Inability: None    Transportation needs     Medical: None     Non-medical: None   Tobacco Use    Smoking status: Never Smoker    Smokeless tobacco: Never Used   Substance and Sexual Activity    Alcohol use: Yes     Comment: social    Drug use: No    Sexual activity: None   Lifestyle    Physical activity     Days per week: None     Minutes per session: None    Stress: None   Relationships    Social connections     Talks on phone: None     Gets together: None     Attends Zoroastrian service: None     Active member of club or organization: None     Attends meetings of clubs or organizations: None     Relationship status: None    Intimate partner violence     Fear of current or ex partner: None     Emotionally abused: None     Physically abused: None     Forced sexual activity: None   Other Topics Concern    None   Social History Narrative    Always uses sunscreen    Caffeine use- 2-3 cups of coffee 1/2 and 1/2    Dental care, regularly    Lives independently with spouse    Uses safety equipment - seatbelts      Medications and Allergies:     Current Outpatient Medications   Medication Sig Dispense Refill    AZOPT 1 % ophthalmic suspension       Butalbital-APAP-Caffeine -40 MG CAPS Take by mouth as needed       cyclobenzaprine (FLEXERIL) 10 mg tablet Take 1 tablet (10 mg total) by mouth every 12 (twelve) hours as needed for muscle spasms 30 tablet 0    meclizine (ANTIVERT) 12 5 MG tablet Take 1 tablet (12 5 mg total) by mouth every 8 (eight) hours as needed for dizziness 30 tablet 2    omeprazole (PriLOSEC) 20 mg delayed release capsule Take 1 capsule (20 mg total) by mouth daily before breakfast 30 capsule 5    RHOPRESSA 0 02 % SOLN   0    Synthroid 50 MCG tablet Take 1 tablet (50 mcg total) by mouth daily 30 tablet 5    timolol (TIMOPTIC) 0 5 % ophthalmic solution Administer to the right eye   0    triamcinolone (KENALOG) 0 1 % cream Apply topically 2 (two) times a day 30 g 0    traZODone (DESYREL) 50 mg tablet Take 1 tablet (50 mg total) by mouth daily at bedtime 30 tablet 5     No current facility-administered medications for this visit        Allergies   Allergen Reactions    Alprazolam     Avelox [Moxifloxacin]     Demerol [Meperidine]     Erythromycin     Lipitor [Atorvastatin] Diarrhea    Prednisone     Shellfish-Derived Products Immunizations:     Immunization History   Administered Date(s) Administered    INFLUENZA 01/01/2008    Influenza Quadrivalent Preservative Free 3 years and older IM 10/27/2015    Influenza Split High Dose Preservative Free IM 08/30/2017    Influenza, high dose seasonal 0 7 mL 11/06/2018, 10/23/2019, 10/08/2020    Influenza, seasonal, injectable 11/15/2013, 10/23/2014, 12/08/2016    Pneumococcal Conjugate 13-Valent 01/08/2019    Pneumococcal Polysaccharide PPV23 01/01/2007    SARS-CoV-2 / COVID-19 mRNA IM (Jackye Babcock) 01/18/2021, 02/15/2021    Td (adult), adsorbed 01/01/2000    Zoster 12/17/2009      Health Maintenance:         Topic Date Due    Colonoscopy Surveillance  01/22/2024         Topic Date Due    DTaP,Tdap,and Td Vaccines (1 - Tdap) [birth year]/1965    Pneumococcal Vaccine: 65+ Years (2 of 2 - PPSV23) 01/08/2020      Medicare Health Risk Assessment:     /70   Temp (!) 96 4 °F (35 8 °C) (Temporal)   Ht 5' 3" (1 6 m)   Wt 57 3 kg (126 lb 4 oz)   BMI 22 36 kg/m²      [facility] is here for her Subsequent Wellness visit  Last Medicare Wellness visit information reviewed, patient interviewed and updates made to the record today  Health Risk Assessment:   Patient rates overall health as good  Patient feels that their physical health rating is same  Eyesight was rated as same  Hearing was rated as same  Patient feels that their emotional and mental health rating is same  Pain experienced in the last 7 days has been some  Patient's pain rating has been 5/10  Patient states that she has experienced no weight loss or gain in last 6 months  Depression Screening:   PHQ-2 Score: 0  PHQ-9 Score: 1      Fall Risk Screening: In the past year, patient has experienced: no history of falling in past year      Urinary Incontinence Screening:   Patient has not leaked urine accidently in the last six months  Home Safety:  Patient does not have trouble with stairs inside or outside of their home   Patient has working smoke alarms and has working carbon monoxide detector  Home safety hazards include: none  Nutrition:   Current diet is Regular  Medications:   Patient is currently taking over-the-counter supplements  OTC medications include: see medication list  Patient is able to manage medications  Activities of Daily Living (ADLs)/Instrumental Activities of Daily Living (IADLs):   Walk and transfer into and out of bed and chair?: Yes  Dress and groom yourself?: Yes    Bathe or shower yourself?: Yes    Feed yourself? Yes  Do your laundry/housekeeping?: Yes  Manage your money, pay your bills and track your expenses?: Yes  Make your own meals?: Yes    Do your own shopping?: Yes    Previous Hospitalizations:   Any hospitalizations or ED visits within the last 12 months?: No      Advance Care Planning:   Living will: Yes    Durable POA for healthcare: Yes    Advanced directive: Yes    End of Life Decisions reviewed with patient: Yes    Provider agrees with end of life decisions: Yes      Cognitive Screening:   Provider or family/friend/caregiver concerned regarding cognition?: No    PREVENTIVE SCREENINGS      Cardiovascular Screening:    General: Screening Not Indicated, History Lipid Disorder and Risks and Benefits Discussed    Due for: Lipid Panel      Colorectal Cancer Screening:     General: Screening Not Indicated      Breast Cancer Screening:     General: Screening Current      Cervical Cancer Screening:    General: Screening Not Indicated      Osteoporosis Screening:    General: Screening Not Indicated and History Osteoporosis      Abdominal Aortic Aneurysm (AAA) Screening:        General: Screening Current      Lung Cancer Screening:     General: Screening Not Indicated      Hepatitis C Screening:    General: Risks and Benefits Discussed    Hep C Screening Accepted: Yes      Other Counseling Topics:   Regular weightbearing exercise         Malissa Martinez, DO

## 2021-03-12 DIAGNOSIS — E78.2 MIXED HYPERLIPIDEMIA: Primary | ICD-10-CM

## 2021-03-12 RX ORDER — ROSUVASTATIN CALCIUM 5 MG/1
5 TABLET, COATED ORAL DAILY
Qty: 90 TABLET | Refills: 3 | Status: SHIPPED | OUTPATIENT
Start: 2021-03-12

## 2021-03-22 DIAGNOSIS — E03.9 ACQUIRED HYPOTHYROIDISM: ICD-10-CM

## 2021-03-22 RX ORDER — LEVOTHYROXINE SODIUM 50 MCG
50 TABLET ORAL DAILY
Qty: 30 TABLET | Refills: 5 | Status: SHIPPED | OUTPATIENT
Start: 2021-03-22 | End: 2021-04-16 | Stop reason: SDUPTHER

## 2021-04-16 DIAGNOSIS — E03.9 ACQUIRED HYPOTHYROIDISM: ICD-10-CM

## 2021-04-16 RX ORDER — LEVOTHYROXINE SODIUM 50 MCG
50 TABLET ORAL DAILY
Qty: 90 TABLET | Refills: 3 | Status: SHIPPED | OUTPATIENT
Start: 2021-04-16 | End: 2022-04-20

## 2021-06-18 ENCOUNTER — APPOINTMENT (OUTPATIENT)
Dept: RADIOLOGY | Facility: MEDICAL CENTER | Age: 76
End: 2021-06-18
Payer: MEDICARE

## 2021-06-18 ENCOUNTER — APPOINTMENT (OUTPATIENT)
Dept: LAB | Facility: MEDICAL CENTER | Age: 76
End: 2021-06-18
Payer: MEDICARE

## 2021-06-18 ENCOUNTER — TELEPHONE (OUTPATIENT)
Dept: INTERNAL MEDICINE CLINIC | Facility: CLINIC | Age: 76
End: 2021-06-18

## 2021-06-18 ENCOUNTER — OFFICE VISIT (OUTPATIENT)
Dept: INTERNAL MEDICINE CLINIC | Facility: CLINIC | Age: 76
End: 2021-06-18
Payer: MEDICARE

## 2021-06-18 VITALS
HEART RATE: 66 BPM | OXYGEN SATURATION: 99 % | WEIGHT: 123 LBS | TEMPERATURE: 96.8 F | DIASTOLIC BLOOD PRESSURE: 70 MMHG | BODY MASS INDEX: 21.79 KG/M2 | SYSTOLIC BLOOD PRESSURE: 112 MMHG | HEIGHT: 63 IN

## 2021-06-18 DIAGNOSIS — R55 SYNCOPE, UNSPECIFIED SYNCOPE TYPE: ICD-10-CM

## 2021-06-18 DIAGNOSIS — Z11.59 ENCOUNTER FOR HEPATITIS C SCREENING TEST FOR LOW RISK PATIENT: ICD-10-CM

## 2021-06-18 DIAGNOSIS — E03.9 ACQUIRED HYPOTHYROIDISM: ICD-10-CM

## 2021-06-18 DIAGNOSIS — M54.50 ACUTE BILATERAL LOW BACK PAIN WITHOUT SCIATICA: Primary | ICD-10-CM

## 2021-06-18 DIAGNOSIS — E78.2 MIXED HYPERLIPIDEMIA: ICD-10-CM

## 2021-06-18 DIAGNOSIS — Z00.00 MEDICARE ANNUAL WELLNESS VISIT, SUBSEQUENT: ICD-10-CM

## 2021-06-18 DIAGNOSIS — M54.50 ACUTE BILATERAL LOW BACK PAIN WITHOUT SCIATICA: ICD-10-CM

## 2021-06-18 DIAGNOSIS — R55 SYNCOPE, UNSPECIFIED SYNCOPE TYPE: Primary | ICD-10-CM

## 2021-06-18 PROCEDURE — 72100 X-RAY EXAM L-S SPINE 2/3 VWS: CPT

## 2021-06-18 PROCEDURE — 73521 X-RAY EXAM HIPS BI 2 VIEWS: CPT

## 2021-06-18 PROCEDURE — 99214 OFFICE O/P EST MOD 30 MIN: CPT | Performed by: INTERNAL MEDICINE

## 2021-06-18 NOTE — PROGRESS NOTES
Assessment/Plan:         Diagnoses and all orders for this visit:    Syncope, unspecified syncope type  -     XR spine lumbar 2 or 3 views injury; Future  -     XR hips bilateral 2 vw w pelvis if performed; Future  -     UA/M w/rflx Culture, Routine  Pt will go today   She had diarrhea prior to this episode so suspect hypovolemia/orthostatis as cause   Discussed possible PT referral     Acquired hypothyroidism  -     TSH, 3rd generation; Future  Recheck tsh     Acute bilateral low back pain without sciatica  -     Cancel: XR spine lumbar 2 or 3 views injury; Future  -     Cancel: XR hips bilateral 2 vw w pelvis if performed; Future  -     XR spine lumbar 2 or 3 views injury; Future  -     XR hips bilateral 2 vw w pelvis if performed; Future  Pt can use otc lidocaine patches and ice to area She could as alternate try voltaren gel as well         Will call for followup based on testing      Patient ID: Chau Boyle is a 67 y o  female  HPI  Pt had a fall - she was at her daughters and got up early AM to go to bathroom She did not feel well and felt like she may throw up She had diarrhea and the next things she recalls is waking up on the floor She had hit her head but no bleeding She is unsure how long she was out for She denies any preceding sxs No dizziness enroute to the bathroom but she did not feel well No other ill contacts She headache No chest pain or sob She has loose stools at times mojgan after using eye drops but she had not had those prior to this episode She did not have further gi issues the following day but felt weaker and did go back to rest for few hours She is ok now and no recurrent sxs but has ongoing pain across her sacral area It is painful to walk and she cannot exercise She did have bruising to the area No urinary sxs or issues       Review of Systems   Constitutional: Positive for activity change  Negative for chills and fever  HENT: Negative      Respiratory: Negative for cough, chest tightness and shortness of breath  Cardiovascular: Negative for chest pain, palpitations and leg swelling  Gastrointestinal: Negative for abdominal distention and abdominal pain  Genitourinary: Negative for difficulty urinating, dysuria, flank pain and frequency  Musculoskeletal: Positive for arthralgias, back pain and gait problem  Neurological: Negative for dizziness, light-headedness and headaches  Psychiatric/Behavioral: Negative for sleep disturbance  The patient is not nervous/anxious  Past Medical History:   Diagnosis Date    Anemia     pernicious    Anxiety     Cancer [facility])     colon, Last Assessed:  10/27/15    Depression     Disease of thyroid gland     GERD (gastroesophageal reflux disease)     Hypercholesterolemia     Hyperlipidemia     Migraine     Osteoporosis      Past Surgical History:   Procedure Laterality Date    APPENDECTOMY      BREAST SURGERY Right     cyst    COLON SURGERY  1998    hemicolectomy    HYSTERECTOMY      MOHS SURGERY      head    OOPHORECTOMY      UT COLONOSCOPY FLX DX W/COLLJ SPEC WHEN PFRMD N/A 3/18/2016    Procedure: COLONOSCOPY;  Surgeon: Yolanda Guerrero MD;  [facility];  Service: Gastroenterology    UT ESOPHAGOGASTRODUODENOSCOPY TRANSORAL DIAGNOSTIC N/A 3/18/2016    Procedure: ESOPHAGOGASTRODUODENOSCOPY (EGD);   Surgeon: Yolanda Guerrero MD;  [facility];  Service: Gastroenterology    TONSILLECTOMY  childhood     Social History     Socioeconomic History    Marital status: /Civil Union     Spouse name: Not on file    Number of children: Not on file    Years of education: Not on file    Highest education level: Not on file   Occupational History    Not on file   Tobacco Use    Smoking status: Never Smoker    Smokeless tobacco: Never Used   Vaping Use    Vaping Use: Never used   Substance and Sexual Activity    Alcohol use: Yes     Comment: social    Drug use: No    Sexual activity: Not on file   Other Topics Concern    Not on file   Social History Narrative    Always uses sunscreen    Caffeine use- 2-3 cups of coffee 1/2 and 1/2    Dental care, regularly    Lives independently with spouse    Uses safety equipment - seatbelts     Social Determinants of Health     Financial Resource Strain:     Difficulty of Paying Living Expenses:    Food Insecurity:     Worried About Running Out of Food in the Last Year:     [address] in the Last Year:    Transportation Needs:     Lack of Transportation (Medical):  Lack of Transportation (Non-Medical):    Physical Activity:     Days of Exercise per Week:     Minutes of Exercise per Session:    Stress:     Feeling of Stress :    Social Connections:     Frequency of Communication with Friends and Family:     Frequency of Social Gatherings with Friends and Family:     Attends Restorationism Services:     Active Member of Clubs or Organizations:     Attends Club or Organization Meetings:     Marital Status:    Intimate Partner Violence:     Fear of Current or Ex-Partner:     Emotionally Abused:     Physically Abused:     Sexually Abused: Allergies   Allergen Reactions    Alprazolam     Avelox [Moxifloxacin]     Demerol [Meperidine]     Erythromycin     Lipitor [Atorvastatin] Diarrhea    Prednisone     Shellfish-Derived Products - Food Allergy               /70   Pulse 66   Temp (!) 96 8 °F (36 °C) (Temporal)   Ht 5' 3" (1 6 m)   Wt 55 8 kg (123 lb)   SpO2 99%   BMI 21 79 kg/m²          Physical Exam  Vitals reviewed  Constitutional:       Appearance: Normal appearance  HENT:      Head: Normocephalic and atraumatic  Right Ear: Tympanic membrane, ear canal and external ear normal  There is no impacted cerumen  Left Ear: Tympanic membrane, ear canal and external ear normal  There is no impacted cerumen  Nose: Nose normal       Mouth/Throat:      Mouth: Mucous membranes are dry  Eyes:      General: No scleral icterus  Extraocular Movements: Extraocular movements intact  Conjunctiva/sclera: Conjunctivae normal       Pupils: Pupils are equal, round, and reactive to light  Cardiovascular:      Rate and Rhythm: Normal rate and regular rhythm  Pulses: Normal pulses  Heart sounds: Normal heart sounds  No murmur heard  Pulmonary:      Effort: Pulmonary effort is normal  No respiratory distress  Breath sounds: Normal breath sounds  No wheezing  Abdominal:      General: Bowel sounds are normal  There is no distension  Palpations: Abdomen is soft  Tenderness: There is no abdominal tenderness  Musculoskeletal:         General: Tenderness and deformity present  Normal range of motion  Cervical back: Normal range of motion and neck supple  No rigidity  Right lower leg: No edema  Left lower leg: No edema  Lymphadenopathy:      Cervical: No cervical adenopathy  Skin:     General: Skin is dry  Coloration: Skin is not jaundiced or pale  Neurological:      General: No focal deficit present  Mental Status: She is alert and oriented to person, place, and time  Mental status is at baseline  Cranial Nerves: No cranial nerve deficit  Sensory: No sensory deficit  Psychiatric:         Mood and Affect: Mood normal          Behavior: Behavior normal          Thought Content:  Thought content normal          Judgment: Judgment normal

## 2021-06-18 NOTE — TELEPHONE ENCOUNTER
----- Message from Soco Spencer DO sent at 6/18/2021 12:18 PM EDT -----  I would refer to physical therapy No fracture on xrays Degenerative changes to low back area (not acute)

## 2021-06-22 ENCOUNTER — APPOINTMENT (OUTPATIENT)
Dept: LAB | Facility: CLINIC | Age: 76
End: 2021-06-22
Payer: MEDICARE

## 2021-06-22 DIAGNOSIS — R55 SYNCOPE AND COLLAPSE: Primary | ICD-10-CM

## 2021-06-22 LAB
ALBUMIN SERPL BCP-MCNC: 4.1 G/DL (ref 3.5–5)
ALP SERPL-CCNC: 104 U/L (ref 46–116)
ALT SERPL W P-5'-P-CCNC: 19 U/L (ref 12–78)
ANION GAP SERPL CALCULATED.3IONS-SCNC: 7 MMOL/L (ref 4–13)
AST SERPL W P-5'-P-CCNC: 18 U/L (ref 5–45)
BILIRUB SERPL-MCNC: 0.63 MG/DL (ref 0.2–1)
BILIRUB UR QL STRIP: NEGATIVE
BUN SERPL-MCNC: 11 MG/DL (ref 5–25)
CALCIUM SERPL-MCNC: 9.5 MG/DL (ref 8.3–10.1)
CHLORIDE SERPL-SCNC: 106 MMOL/L (ref 100–108)
CHOLEST SERPL-MCNC: 169 MG/DL (ref 50–200)
CLARITY UR: CLEAR
CO2 SERPL-SCNC: 25 MMOL/L (ref 21–32)
COLOR UR: YELLOW
CREAT SERPL-MCNC: 0.67 MG/DL (ref 0.6–1.3)
GFR SERPL CREATININE-BSD FRML MDRD: 85 ML/MIN/1.73SQ M
GLUCOSE P FAST SERPL-MCNC: 89 MG/DL (ref 65–99)
GLUCOSE UR STRIP-MCNC: NEGATIVE MG/DL
HCV AB SER QL: NORMAL
HDLC SERPL-MCNC: 69 MG/DL
HGB UR QL STRIP.AUTO: NEGATIVE
KETONES UR STRIP-MCNC: NEGATIVE MG/DL
LDLC SERPL CALC-MCNC: 83 MG/DL (ref 0–100)
LEUKOCYTE ESTERASE UR QL STRIP: NEGATIVE
NITRITE UR QL STRIP: NEGATIVE
NONHDLC SERPL-MCNC: 100 MG/DL
PH UR STRIP.AUTO: 7.5 [PH]
POTASSIUM SERPL-SCNC: 4.4 MMOL/L (ref 3.5–5.3)
PROT SERPL-MCNC: 7 G/DL (ref 6.4–8.2)
PROT UR STRIP-MCNC: NEGATIVE MG/DL
SODIUM SERPL-SCNC: 138 MMOL/L (ref 136–145)
SP GR UR STRIP.AUTO: 1.01 (ref 1–1.03)
TRIGL SERPL-MCNC: 84 MG/DL
TSH SERPL DL<=0.05 MIU/L-ACNC: 3.38 UIU/ML (ref 0.36–3.74)
UROBILINOGEN UR QL STRIP.AUTO: 0.2 E.U./DL

## 2021-06-22 PROCEDURE — 84443 ASSAY THYROID STIM HORMONE: CPT

## 2021-06-22 PROCEDURE — 81003 URINALYSIS AUTO W/O SCOPE: CPT

## 2021-06-22 PROCEDURE — 36415 COLL VENOUS BLD VENIPUNCTURE: CPT

## 2021-06-22 PROCEDURE — 86803 HEPATITIS C AB TEST: CPT

## 2021-06-22 PROCEDURE — 80061 LIPID PANEL: CPT

## 2021-06-22 PROCEDURE — 80053 COMPREHEN METABOLIC PANEL: CPT

## 2021-06-23 ENCOUNTER — TELEPHONE (OUTPATIENT)
Dept: INTERNAL MEDICINE CLINIC | Facility: CLINIC | Age: 76
End: 2021-06-23

## 2021-06-23 NOTE — TELEPHONE ENCOUNTER
Pt is going away as of tomorrow to the beach for a week and will let us know when she gets back if she wants to have the echo done

## 2021-06-23 NOTE — TELEPHONE ENCOUNTER
----- Message from Erica Parra DO sent at 6/23/2021  6:04 AM EDT -----  Since labs normal and urine neg would order echo since recent syncope

## 2021-07-06 ENCOUNTER — TELEPHONE (OUTPATIENT)
Dept: INTERNAL MEDICINE CLINIC | Facility: CLINIC | Age: 76
End: 2021-07-06

## 2021-07-06 DIAGNOSIS — R55 SYNCOPE, UNSPECIFIED SYNCOPE TYPE: Primary | ICD-10-CM

## 2021-07-09 ENCOUNTER — HOSPITAL ENCOUNTER (OUTPATIENT)
Dept: NON INVASIVE DIAGNOSTICS | Facility: HOSPITAL | Age: 76
Discharge: HOME | End: 2021-07-09
Attending: INTERNAL MEDICINE
Payer: MEDICARE

## 2021-07-09 DIAGNOSIS — R55 SYNCOPE, UNSPECIFIED SYNCOPE TYPE: ICD-10-CM

## 2021-07-09 PROCEDURE — 93306 TTE W/DOPPLER COMPLETE: CPT

## 2021-07-09 PROCEDURE — 93306 TTE W/DOPPLER COMPLETE: CPT | Performed by: INTERNAL MEDICINE

## 2021-07-15 ENCOUNTER — EVALUATION (OUTPATIENT)
Dept: PHYSICAL THERAPY | Facility: CLINIC | Age: 76
End: 2021-07-15
Payer: MEDICARE

## 2021-07-15 DIAGNOSIS — M54.50 ACUTE BILATERAL LOW BACK PAIN WITHOUT SCIATICA: ICD-10-CM

## 2021-07-15 DIAGNOSIS — M95.5 ACQUIRED PELVIC OBLIQUITY: Primary | ICD-10-CM

## 2021-07-15 PROCEDURE — 97161 PT EVAL LOW COMPLEX 20 MIN: CPT | Performed by: PHYSICAL THERAPIST

## 2021-07-15 PROCEDURE — 97110 THERAPEUTIC EXERCISES: CPT | Performed by: PHYSICAL THERAPIST

## 2021-07-15 PROCEDURE — 97140 MANUAL THERAPY 1/> REGIONS: CPT | Performed by: PHYSICAL THERAPIST

## 2021-07-15 NOTE — PROGRESS NOTES
PT Evaluation     Today's date: 7/15/2021  Patient name: Jana Cespedes  : 1945  MRN: [id number]  Referring provider: Governor Chary DO  Dx:   Encounter Diagnosis     ICD-10-CM    1  Acquired pelvic obliquity  M95 5    2  Acute bilateral low back pain without sciatica  M54 5 Ambulatory referral to Physical Therapy                  Assessment  Assessment details: Jana Cespedes is a 67 y o  female who presents with S/S consistent with a pelvic obliquity  Due to these impairments, patient has difficulty with bending, sitting, walking and climbing stairs  Patient's clinical presentation is consistent with their referring diagnosis of Acute bilateral low back pain without sciatica  Plan: Ambulatory referral to Physical Therapy  Patient has been educated in how the pelvic can effect her low back  Patient would benefit from skilled physical therapy services to address their aforementioned functional limitations and progress towards prior level of function and independence with home exercise program      Impairments: abnormal or restricted ROM, activity intolerance, impaired balance, impaired physical strength, lacks appropriate home exercise program and pain with function  Understanding of Dx/Px/POC: good   Prognosis: good    Goals  Short Term Goals:    1  Initiate and advance HEP  2  Improve hip Ext AROM    Long Term Goals:    1  Indep with HEP  2  Improve sitting endurance  3   Improve pain with stair climbing    Plan  Patient would benefit from: skilled PT  Planned modality interventions: cryotherapy, electrical stimulation/Russian stimulation and thermotherapy: hydrocollator packs  Planned therapy interventions: joint mobilization, manual therapy, patient education, postural training, activity modification, abdominal trunk stabilization, body mechanics training, flexibility, functional ROM exercises, graded exercise, home exercise program, neuromuscular re-education, strengthening, stretching, therapeutic activities, therapeutic exercise, motor coordination training, muscle pump exercises, gait training, balance/weight bearing training and ADL training  Frequency: 2x week  Duration in weeks: 8  Treatment plan discussed with: patient        Subjective Evaluation    History of Present Illness  Mechanism of injury: Pt reports falling in her butt in 2020  Pain  Current pain rating: 3  At best pain rating: 3  At worst pain ratin  Quality: dull ache  Aggravating factors: stair climbing, walking and sitting    Treatments  Current treatment: physical therapy  Patient Goals  Patient goals for therapy: decreased pain, return to sport/leisure activities, improved balance and increased strength          Objective     Static Posture     Pelvis   Pelvis (Left): Obliquity  Postural Observations  Seated posture:   Active Range of Motion     Lumbar   Flexion: 70 degrees   Extension: 16 degrees   Left lateral flexion: 18 degrees       Right lateral flexion: 18 degrees   Left Hip   Flexion: 110 degrees   Extension: -8 degrees   External rotation (90/90): [employer]  Internal rotation (90/90): WFL    Right Hip   Flexion: 110 degrees   Extension: -8 degrees   External rotation (90/90): [employer] [facility] [place]  Internal rotation (90/90): [facility]    Strength/Myotome Testing     Left Hip   Planes of Motion   Flexion: 4  Extension: 0  Abduction: 3+  External rotation: 4  Internal rotation: 4    Right Hip   Planes of Motion   Flexion: 4  Extension: 0  Abduction: 3+  External rotation: 4  Internal rotation: 4    Additional Strength Details  Core:  /5    Tests     Lumbar     Left   Negative slump test      Right   Negative slump test      Left Hip   Positive Ely's and long sit  Right Hip   Positive Ely's and long sit       Additional Tests Details  Repeated Motion Test:  Flexion: no change  Extension: no change    L side Ant Rotation  Hamstring Flexibility:  R:-28  L:-31                 Precautions: Osteoporosis, Hx of CA      Manuals 7/15 MFR to Hip Flexors             Correction of pelvic obliquity                                       Neuro Re-Ed             TA Set             TA Set with SLR             TA Set with Marching                                                                 Ther Ex             Elliptical             Hip Flexor Stretch 1 5'hold 1x            Hamstring Curls 30x            SKTC             Side Steps             Step ups             Hip 4-way                          Ther Activity                                       Gait Training                                       Modalities

## 2021-07-21 ENCOUNTER — OFFICE VISIT (OUTPATIENT)
Dept: PHYSICAL THERAPY | Facility: CLINIC | Age: 76
End: 2021-07-21
Payer: MEDICARE

## 2021-07-21 DIAGNOSIS — M54.50 ACUTE BILATERAL LOW BACK PAIN WITHOUT SCIATICA: Primary | ICD-10-CM

## 2021-07-21 DIAGNOSIS — M95.5 ACQUIRED PELVIC OBLIQUITY: ICD-10-CM

## 2021-07-21 PROCEDURE — 97112 NEUROMUSCULAR REEDUCATION: CPT

## 2021-07-21 PROCEDURE — 97110 THERAPEUTIC EXERCISES: CPT

## 2021-07-21 PROCEDURE — 97140 MANUAL THERAPY 1/> REGIONS: CPT

## 2021-07-21 NOTE — PROGRESS NOTES
Daily Note     Today's date: 2021  Patient name: Venessa Shahid  : 1945  MRN: [id number]  Referring provider: Eladia Bennett DO  Dx:   Encounter Diagnosis     ICD-10-CM    1  Acute bilateral low back pain without sciatica  M54 5    2  Acquired pelvic obliquity  M95 5                   Subjective: patient stated she is still sore across the bottom of her low back  Objective: See treatment diary below      Assessment: Educated patient on POC established for their specific diagnosis and their LOF  Verbal and visual cues required for proper execution of exercise with program  Overall tolerated treatment well  Will continue to progress in upcoming visits as able      Plan: Continue per plan of care  Progress treatment as tolerated         Precautions: Osteoporosis, Hx of CA      Manuals 7/15 7/21      MFR to Hip Flexors  15 min      Correction of pelvic obliquity                        Neuro Re-Ed        TA Set 10" hold  10x      TA Set with SLR        TA Set with Marching  10x                                      Ther Ex        Elliptical  4 min L1      Hip Flexor Stretch 1 5'hold 1x 1x      Hamstring Curls 30x 30x      SKTC 30" hold  3x      Side Steps  4x 10 feet      Step ups  bilat   4" step   10x      Hip 4-way  bilat   10x red ban              Ther Activity                        Gait Training                        Modalities

## 2021-07-23 ENCOUNTER — OFFICE VISIT (OUTPATIENT)
Dept: PHYSICAL THERAPY | Facility: CLINIC | Age: 76
End: 2021-07-23
Payer: MEDICARE

## 2021-07-23 DIAGNOSIS — M54.50 ACUTE BILATERAL LOW BACK PAIN WITHOUT SCIATICA: Primary | ICD-10-CM

## 2021-07-23 DIAGNOSIS — M95.5 ACQUIRED PELVIC OBLIQUITY: ICD-10-CM

## 2021-07-23 PROCEDURE — 97140 MANUAL THERAPY 1/> REGIONS: CPT

## 2021-07-23 PROCEDURE — 97110 THERAPEUTIC EXERCISES: CPT

## 2021-07-23 PROCEDURE — 97112 NEUROMUSCULAR REEDUCATION: CPT

## 2021-07-23 NOTE — PROGRESS NOTES
Daily Note     Today's date: 2021  Patient name: Eitan Buck  : 1945  MRN: [id number]  Referring provider: Ji Connor DO  Dx:   Encounter Diagnosis     ICD-10-CM    1  Acute bilateral low back pain without sciatica  M54 5    2  Acquired pelvic obliquity  M95 5                   Subjective: Patient warm up NS due to elliptical being in use  Patient c/o L sided SI pain following Nu Step  Patient states she is compliant with HEP  Objective: See treatment diary below      Assessment: Tolerated treatment well  Patient exhibited good technique with therapeutic exercises with verbal cuing  Patient expressed interest in TENS as recommended by her physician, she will continue to think about it  Plan: Continue per plan of care        Precautions: Osteoporosis, Hx of CA      Manuals 7/15 7/21 7/23     MFR to Hip Flexors  15 min 15 min     Correction of pelvic obliquity                        Neuro Re-Ed        TA Set 10" hold  10x 10x     TA Set with SLR        TA Set with Marching  10x 20x                                      Ther Ex        Elliptical  4 min L1 NS 6 min L5 (Ell in use)     Hip Flexor Stretch 1 5'hold 1x 1x 1x     Hamstring Curls 30x 30x 30x     SKTC 30" hold  3x 3x     Side Steps  4x 10 feet 6x 10 ft RTB     Step ups  bilat   4" step   10x 6" 20x     Hip 4-way  bilat   10x red band 10x RTB             Ther Activity                        Gait Training                        Modalities

## 2021-07-26 ENCOUNTER — OFFICE VISIT (OUTPATIENT)
Dept: PHYSICAL THERAPY | Facility: CLINIC | Age: 76
End: 2021-07-26
Payer: MEDICARE

## 2021-07-26 DIAGNOSIS — M54.50 ACUTE BILATERAL LOW BACK PAIN WITHOUT SCIATICA: Primary | ICD-10-CM

## 2021-07-26 DIAGNOSIS — M95.5 ACQUIRED PELVIC OBLIQUITY: ICD-10-CM

## 2021-07-26 PROCEDURE — 97140 MANUAL THERAPY 1/> REGIONS: CPT

## 2021-07-26 PROCEDURE — 97110 THERAPEUTIC EXERCISES: CPT

## 2021-07-26 NOTE — PROGRESS NOTES
Daily Note     Today's date: 2021  Patient name: Akbar Gardner  : 1945  MRN: [id number]  Referring provider: Dariela Benavides DO  Dx:   Encounter Diagnosis     ICD-10-CM    1  Acute bilateral low back pain without sciatica  M54 5    2  Acquired pelvic obliquity  M95 5                   Subjective: Patient reports noticing improvement overall  Objective: See treatment diary below      Assessment: Tolerated treatment well  Patient exhibited good technique with therapeutic exercises      Plan: Continue per plan of care        Precautions: Osteoporosis, Hx of CA      Manuals 7/15 7/21 7/23 7/26    MFR to Hip Flexors  15 min 15 min 15 min    Correction of pelvic obliquity                        Neuro Re-Ed        TA Set 10" hold  10x 10x 10x    TA Set with SLR        TA Set with Marching  10x 20x  20x                                    Ther Ex        Elliptical  4 min L1 NS 6 min L5 (Ell in use) 4 min L1    Hip Flexor Stretch 1 5'hold 1x 1x 1x 1x bilat    Hamstring Curls 30x 30x 30x 30x    SKTC 30" hold  3x 3x     Side Steps  4x 10 feet 6x 10 ft RTB 6x 10 ft Red    Step ups  bilat   4" step   10x 6" 20x 6" 20x    Hip 4-way  bilat   10x red band 10x RTB 20x RTB            Ther Activity                        Gait Training                        Modalities

## 2021-07-28 ENCOUNTER — APPOINTMENT (OUTPATIENT)
Dept: PHYSICAL THERAPY | Facility: CLINIC | Age: 76
End: 2021-07-28
Payer: MEDICARE

## 2021-07-28 NOTE — PROGRESS NOTES
Daily Note     Today's date: 2021  Patient name: Alexa Edwards  : 1945  MRN: [id number]  Referring provider: Radha Nuñez DO  Dx:   Encounter Diagnosis     ICD-10-CM    1  Acute bilateral low back pain without sciatica  M54 5    2  Acquired pelvic obliquity  M95 5                   Subjective: ***      Objective: See treatment diary below      Assessment: Tolerated treatment {Tolerated treatment :}   Patient {assessment:}      Plan: {PLAN:3040237130}     Precautions: Osteoporosis, Hx of CA      Manuals 7/15 7/21 7/23 7/26 7/28   MFR to Hip Flexors  15 min 15 min 15 min 15 miin   Correction of pelvic obliquity                        Neuro Re-Ed        TA Set 10" hold  10x 10x 10x 10x   TA Set with SLR        TA Set with Marching  10x 20x  20x 20x                                   Ther Ex        Elliptical  4 min L1 NS 6 min L5 (Ell in use) 4 min L1 4 min L1   Hip Flexor Stretch 1 5'hold 1x 1x 1x 1x bilat 1x bilat   Hamstring Curls 30x 30x 30x 30x 30x   SKTC 30" hold  3x 3x  3x   Side Steps  4x 10 feet 6x 10 ft RTB 6x 10 ft Red 6x 10 ft  red   Step ups  bilat   4" step   10x 6" 20x 6" 20x 6" step   Hip 4-way  bilat   10x red band 10x RTB 20x RTB 20x RTB           Ther Activity                        Gait Training                        Modalities

## 2021-08-19 ENCOUNTER — OFFICE VISIT (OUTPATIENT)
Dept: CARDIOLOGY CLINIC | Facility: CLINIC | Age: 76
End: 2021-08-19
Payer: MEDICARE

## 2021-08-19 VITALS
HEIGHT: 63 IN | DIASTOLIC BLOOD PRESSURE: 80 MMHG | HEART RATE: 63 BPM | SYSTOLIC BLOOD PRESSURE: 144 MMHG | WEIGHT: 123 LBS | BODY MASS INDEX: 21.79 KG/M2

## 2021-08-19 DIAGNOSIS — I35.1 NONRHEUMATIC AORTIC VALVE INSUFFICIENCY: ICD-10-CM

## 2021-08-19 DIAGNOSIS — R55 VASOVAGAL SYNCOPE: Chronic | ICD-10-CM

## 2021-08-19 DIAGNOSIS — I10 BENIGN ESSENTIAL HTN: ICD-10-CM

## 2021-08-19 DIAGNOSIS — R93.1 ABNORMAL ECHOCARDIOGRAM: Primary | ICD-10-CM

## 2021-08-19 PROCEDURE — 99204 OFFICE O/P NEW MOD 45 MIN: CPT | Performed by: INTERNAL MEDICINE

## 2021-08-19 PROCEDURE — 93000 ELECTROCARDIOGRAM COMPLETE: CPT | Performed by: INTERNAL MEDICINE

## 2021-08-19 NOTE — ASSESSMENT & PLAN NOTE
Blood pressure bit high today but has been well controlled at her primary care physician office  I am not going to make any changes today

## 2021-08-19 NOTE — PROGRESS NOTES
Patient ID: Kaur Mayen is a 67 y o  female  Plan:      Nonrheumatic aortic valve insufficiency  Moderate by echo but I cannot hear anything on exam   I do not believe that there is true significant aortic insufficiency  Benign essential HTN  Blood pressure bit high today but has been well controlled at her primary care physician office  I am not going to make any changes today  Syncope  Sounds quite clearly like a combination of orthostasis and transient high vagal tone  Low likelihood of recurrence and no further workup is needed  Follow up Plan/Other summary comments:  Mainly I reassured the patient today and I will be available to see her again as needed  HPI:  Patient is seen today regarding the above issues  In June she was at her daughter's house  Upon awakening she went downstairs to the bathroom  She had diarrhea and as she was getting out of the bathroom she felt quite lightheaded and passed out  There has been no recurrence since nor any prior  In general the patient is very active  She is able to walk with vigor  There has been no chest pain or chest pressure  No dyspnea  Although an echo revealed moderate aortic insufficiency I do not hear anything on exam and the pulse pressure is not wide  Results for orders placed or performed in visit on 08/19/21   POCT ECG    Impression    NSR  WNL  Most recent or relevant cardiac/vascular testing:    TT 07/09/2021:  Normal LV systolic function  Moderate aortic insufficiency        Past Surgical History:   Procedure Laterality Date    APPENDECTOMY      BREAST SURGERY Right     cyst    COLON SURGERY  1998    hemicolectomy    HYSTERECTOMY      MOHS SURGERY      head    OOPHORECTOMY      CT COLONOSCOPY FLX DX W/COLLJ SPEC WHEN PFRMD N/A 3/18/2016    Procedure: COLONOSCOPY;  Surgeon: Dallas Hampton MD;  Location: [facility];  Service: Gastroenterology    CT ESOPHAGOGASTRODUODENOSCOPY TRANSORAL DIAGNOSTIC N/A 3/18/2016    Procedure: ESOPHAGOGASTRODUODENOSCOPY (EGD); Surgeon: Lyssa Kaufman MD;  Location: [facility];  Service: Gastroenterology    TONSILLECTOMY  childhood     CMP:   Lab Results   Component Value Date     05/29/2014    K 4 4 06/22/2021    K 4 8 05/29/2014     06/22/2021     05/29/2014    CO2 25 06/22/2021    CO2 28 4 05/29/2014    BUN 11 06/22/2021    BUN 16 05/29/2014    CREATININE 0 67 06/22/2021    CREATININE 0 61 05/29/2014    GLUCOSE 86 05/29/2014    EGFR 85 06/22/2021       Lipid Profile:   Lab Results   Component Value Date    TRIG 84 06/22/2021    HDL 69 06/22/2021         Review of Systems   10  point ROS  was otherwise non pertinent or negative except as per HPI or as below  Gait: Normal         Objective:     /80   Pulse 63   Ht 5' 3" (1 6 m)   Wt 55 8 kg (123 lb)   BMI 21 79 kg/m²     PHYSICAL EXAM:    General:  Normal appearance in no distress  Eyes:  Anicteric  Oral mucosa:  Moist   Neck:  No JVD  Carotid upstrokes are brisk without bruits  No masses  Chest:  Clear to auscultation  Cardiac:  No palpable PMI  Normal S1 and S2  No murmur gallop or rub  Abdomen:  Soft and nontender  No palpable organomegaly or aortic enlargement  Extremities:  No peripheral edema  Musculoskeletal:  Symmetric  Vascular:  Femoral pulses are brisk without bruits  Popliteal pulses are intact bilaterally  Pedal pulses are intact  Neuro:  Grossly symmetric  Psych:  Alert and oriented x3          Current Outpatient Medications:     AZOPT 1 % ophthalmic suspension, , Disp: , Rfl:     Butalbital-APAP-Caffeine -40 MG CAPS, Take by mouth as needed , Disp: , Rfl:     omeprazole (PriLOSEC) 20 mg delayed release capsule, Take 1 capsule (20 mg total) by mouth daily before breakfast, Disp: 30 capsule, Rfl: 5    rosuvastatin (CRESTOR) 5 mg tablet, Take 1 tablet (5 mg total) by mouth daily, Disp: 90 tablet, Rfl: 3    Synthroid 50 MCG tablet, Take 1 tablet (50 mcg total) by mouth daily, Disp: 90 tablet, Rfl: 3    timolol (TIMOPTIC) 0 5 % ophthalmic solution, Administer to the right eye , Disp: , Rfl: 0    cyclobenzaprine (FLEXERIL) 10 mg tablet, Take 1 tablet (10 mg total) by mouth every 12 (twelve) hours as needed for muscle spasms (Patient not taking: Reported on 6/18/2021), Disp: 30 tablet, Rfl: 0    meclizine (ANTIVERT) 12 5 MG tablet, Take 1 tablet (12 5 mg total) by mouth every 8 (eight) hours as needed for dizziness (Patient not taking: Reported on 6/18/2021), Disp: 30 tablet, Rfl: 2    RHOPRESSA 0 02 % SOLN, , Disp: , Rfl: 0    traZODone (DESYREL) 50 mg tablet, Take 1 tablet (50 mg total) by mouth daily at bedtime (Patient not taking: Reported on 6/18/2021), Disp: 30 tablet, Rfl: 5    triamcinolone (KENALOG) 0 1 % cream, Apply topically 2 (two) times a day (Patient not taking: Reported on 6/18/2021), Disp: 30 g, Rfl: 0  Allergies   Allergen Reactions    Alprazolam     Avelox [Moxifloxacin]     Demerol [Meperidine]     Erythromycin     Lipitor [Atorvastatin] Diarrhea    Prednisone     Shellfish-Derived Products - Food Allergy      Past Medical History:   Diagnosis Date    Anemia     pernicious    Anxiety     Aortic regurgitation     Cancer (HCC)     colon, Last Assessed:  10/27/15    Depression     Disease of thyroid gland     GERD (gastroesophageal reflux disease)     Hyperlipidemia     Migraine     Osteoporosis            Social History     Tobacco Use   Smoking Status Never Smoker   Smokeless Tobacco Never Used

## 2021-08-19 NOTE — ASSESSMENT & PLAN NOTE
Moderate by echo but I cannot hear anything on exam   I do not believe that there is true significant aortic insufficiency

## 2021-08-19 NOTE — ASSESSMENT & PLAN NOTE
Sounds quite clearly like a combination of orthostasis and transient high vagal tone  Low likelihood of recurrence and no further workup is needed

## 2021-09-07 DIAGNOSIS — R42 VERTIGO: ICD-10-CM

## 2021-09-07 RX ORDER — MECLIZINE HCL 12.5 MG/1
12.5 TABLET ORAL EVERY 8 HOURS PRN
Qty: 30 TABLET | Refills: 3 | Status: SHIPPED | OUTPATIENT
Start: 2021-09-07 | End: 2022-03-29 | Stop reason: SDUPTHER

## 2021-10-14 ENCOUNTER — TELEPHONE (OUTPATIENT)
Dept: FAMILY MEDICINE CLINIC | Facility: CLINIC | Age: 76
End: 2021-10-14

## 2021-10-15 ENCOUNTER — OFFICE VISIT (OUTPATIENT)
Dept: FAMILY MEDICINE CLINIC | Facility: CLINIC | Age: 76
End: 2021-10-15
Payer: MEDICARE

## 2021-10-15 ENCOUNTER — APPOINTMENT (OUTPATIENT)
Dept: LAB | Facility: MEDICAL CENTER | Age: 76
End: 2021-10-15
Payer: MEDICARE

## 2021-10-15 VITALS
SYSTOLIC BLOOD PRESSURE: 122 MMHG | RESPIRATION RATE: 18 BRPM | DIASTOLIC BLOOD PRESSURE: 78 MMHG | TEMPERATURE: 97.1 F | HEIGHT: 63 IN | BODY MASS INDEX: 21.26 KG/M2 | HEART RATE: 80 BPM | WEIGHT: 120 LBS

## 2021-10-15 DIAGNOSIS — Z23 NEEDS FLU SHOT: ICD-10-CM

## 2021-10-15 DIAGNOSIS — K59.1 FUNCTIONAL DIARRHEA: Primary | ICD-10-CM

## 2021-10-15 DIAGNOSIS — F41.8 OTHER SPECIFIED ANXIETY DISORDERS: ICD-10-CM

## 2021-10-15 DIAGNOSIS — J30.1 NON-SEASONAL ALLERGIC RHINITIS DUE TO POLLEN: ICD-10-CM

## 2021-10-15 DIAGNOSIS — K59.1 FUNCTIONAL DIARRHEA: ICD-10-CM

## 2021-10-15 LAB
ALBUMIN SERPL BCP-MCNC: 3.7 G/DL (ref 3.5–5)
ALP SERPL-CCNC: 91 U/L (ref 46–116)
ALT SERPL W P-5'-P-CCNC: 17 U/L (ref 12–78)
ANION GAP SERPL CALCULATED.3IONS-SCNC: 3 MMOL/L (ref 4–13)
AST SERPL W P-5'-P-CCNC: 13 U/L (ref 5–45)
BASOPHILS # BLD AUTO: 0.03 THOUSANDS/ΜL (ref 0–0.1)
BASOPHILS NFR BLD AUTO: 1 % (ref 0–1)
BILIRUB SERPL-MCNC: 0.49 MG/DL (ref 0.2–1)
BUN SERPL-MCNC: 10 MG/DL (ref 5–25)
CALCIUM SERPL-MCNC: 9.5 MG/DL (ref 8.3–10.1)
CHLORIDE SERPL-SCNC: 108 MMOL/L (ref 100–108)
CO2 SERPL-SCNC: 28 MMOL/L (ref 21–32)
CREAT SERPL-MCNC: 0.71 MG/DL (ref 0.6–1.3)
EOSINOPHIL # BLD AUTO: 0.06 THOUSAND/ΜL (ref 0–0.61)
EOSINOPHIL NFR BLD AUTO: 1 % (ref 0–6)
ERYTHROCYTE [DISTWIDTH] IN BLOOD BY AUTOMATED COUNT: 12.1 % (ref 11.6–15.1)
GFR SERPL CREATININE-BSD FRML MDRD: 82 ML/MIN/1.73SQ M
GLUCOSE P FAST SERPL-MCNC: 97 MG/DL (ref 65–99)
HCT VFR BLD AUTO: 42.2 % (ref 34.8–46.1)
HGB BLD-MCNC: 13.6 G/DL (ref 11.5–15.4)
IMM GRANULOCYTES # BLD AUTO: 0.01 THOUSAND/UL (ref 0–0.2)
IMM GRANULOCYTES NFR BLD AUTO: 0 % (ref 0–2)
LYMPHOCYTES # BLD AUTO: 1.23 THOUSANDS/ΜL (ref 0.6–4.47)
LYMPHOCYTES NFR BLD AUTO: 28 % (ref 14–44)
MCH RBC QN AUTO: 30.3 PG (ref 26.8–34.3)
MCHC RBC AUTO-ENTMCNC: 32.2 G/DL (ref 31.4–37.4)
MCV RBC AUTO: 94 FL (ref 82–98)
MONOCYTES # BLD AUTO: 0.34 THOUSAND/ΜL (ref 0.17–1.22)
MONOCYTES NFR BLD AUTO: 8 % (ref 4–12)
NEUTROPHILS # BLD AUTO: 2.81 THOUSANDS/ΜL (ref 1.85–7.62)
NEUTS SEG NFR BLD AUTO: 62 % (ref 43–75)
NRBC BLD AUTO-RTO: 0 /100 WBCS
PLATELET # BLD AUTO: 274 THOUSANDS/UL (ref 149–390)
PMV BLD AUTO: 9.5 FL (ref 8.9–12.7)
POTASSIUM SERPL-SCNC: 4.2 MMOL/L (ref 3.5–5.3)
PROT SERPL-MCNC: 6.9 G/DL (ref 6.4–8.2)
RBC # BLD AUTO: 4.49 MILLION/UL (ref 3.81–5.12)
SODIUM SERPL-SCNC: 139 MMOL/L (ref 136–145)
TSH SERPL DL<=0.05 MIU/L-ACNC: 3.86 UIU/ML (ref 0.36–3.74)
WBC # BLD AUTO: 4.48 THOUSAND/UL (ref 4.31–10.16)

## 2021-10-15 PROCEDURE — 80053 COMPREHEN METABOLIC PANEL: CPT

## 2021-10-15 PROCEDURE — 99213 OFFICE O/P EST LOW 20 MIN: CPT | Performed by: INTERNAL MEDICINE

## 2021-10-15 PROCEDURE — 84443 ASSAY THYROID STIM HORMONE: CPT

## 2021-10-15 PROCEDURE — 90662 IIV NO PRSV INCREASED AG IM: CPT | Performed by: INTERNAL MEDICINE

## 2021-10-15 PROCEDURE — G0008 ADMIN INFLUENZA VIRUS VAC: HCPCS | Performed by: INTERNAL MEDICINE

## 2021-10-15 PROCEDURE — 85025 COMPLETE CBC W/AUTO DIFF WBC: CPT

## 2021-10-15 PROCEDURE — 36415 COLL VENOUS BLD VENIPUNCTURE: CPT

## 2021-10-15 RX ORDER — LEVOCETIRIZINE DIHYDROCHLORIDE 5 MG/1
5 TABLET, FILM COATED ORAL EVERY EVENING
Qty: 30 TABLET | Refills: 5 | Status: SHIPPED | OUTPATIENT
Start: 2021-10-15

## 2021-10-15 RX ORDER — CHOLESTYRAMINE 4 G/9G
1 POWDER, FOR SUSPENSION ORAL 2 TIMES DAILY PRN
Qty: 60 EACH | Refills: 1 | Status: SHIPPED | OUTPATIENT
Start: 2021-10-15 | End: 2022-03-29 | Stop reason: ALTCHOICE

## 2021-10-28 ENCOUNTER — CONSULT (OUTPATIENT)
Dept: GASTROENTEROLOGY | Facility: CLINIC | Age: 76
End: 2021-10-28
Payer: MEDICARE

## 2021-10-28 VITALS
DIASTOLIC BLOOD PRESSURE: 70 MMHG | HEART RATE: 82 BPM | WEIGHT: 120.4 LBS | BODY MASS INDEX: 21.33 KG/M2 | TEMPERATURE: 97.6 F | SYSTOLIC BLOOD PRESSURE: 130 MMHG | RESPIRATION RATE: 16 BRPM | HEIGHT: 63 IN

## 2021-10-28 DIAGNOSIS — K59.1 FUNCTIONAL DIARRHEA: Primary | ICD-10-CM

## 2021-10-28 DIAGNOSIS — R10.9 ABDOMINAL PAIN, UNSPECIFIED ABDOMINAL LOCATION: ICD-10-CM

## 2021-10-28 DIAGNOSIS — R19.4 CHANGE IN BOWEL HABITS: ICD-10-CM

## 2021-10-28 PROCEDURE — 99204 OFFICE O/P NEW MOD 45 MIN: CPT | Performed by: INTERNAL MEDICINE

## 2021-10-28 RX ORDER — POLYETHYLENE GLYCOL 3350 17 G/17G
17 POWDER, FOR SOLUTION ORAL DAILY
Qty: 510 G | Refills: 1 | Status: SHIPPED | OUTPATIENT
Start: 2021-10-28 | End: 2022-03-29 | Stop reason: ALTCHOICE

## 2022-01-04 ENCOUNTER — OFFICE VISIT (OUTPATIENT)
Dept: GASTROENTEROLOGY | Facility: CLINIC | Age: 77
End: 2022-01-04
Payer: MEDICARE

## 2022-01-04 VITALS
TEMPERATURE: 98.7 F | HEART RATE: 68 BPM | SYSTOLIC BLOOD PRESSURE: 118 MMHG | RESPIRATION RATE: 16 BRPM | WEIGHT: 122 LBS | HEIGHT: 63 IN | DIASTOLIC BLOOD PRESSURE: 66 MMHG | BODY MASS INDEX: 21.62 KG/M2

## 2022-01-04 DIAGNOSIS — R19.4 CHANGE IN BOWEL HABITS: Primary | ICD-10-CM

## 2022-01-04 DIAGNOSIS — K21.9 GASTROESOPHAGEAL REFLUX DISEASE, UNSPECIFIED WHETHER ESOPHAGITIS PRESENT: ICD-10-CM

## 2022-01-04 PROCEDURE — 99214 OFFICE O/P EST MOD 30 MIN: CPT | Performed by: INTERNAL MEDICINE

## 2022-01-04 RX ORDER — OMEPRAZOLE 20 MG/1
20 CAPSULE, DELAYED RELEASE ORAL
Qty: 30 CAPSULE | Refills: 5 | Status: SHIPPED | OUTPATIENT
Start: 2022-01-04

## 2022-01-04 NOTE — PROGRESS NOTES
[facility] - Outpatient Consultation  Albino Maico 67 y o  female MRN: [id number]  Encounter: [id number]          ASSESSMENT AND PLAN:      1  Change in bowel habits  Patient does not describe a history of diarrhea, rather incomplete evacuation of normal formed stool  Her symptoms worsen when she takes fiber which results in constipation  She has had good response to treating more so constipation with MiraLax  I have encouraged her to add cap full of MiraLax to her coffee every morning to avoid extra fluids resulting in nausea  This may allow for her to have regular large volume stools  She is happy with the results of this intervention, and will continue to take MiraLax more regularly  2  Gastroesophageal reflux disease, unspecified whether esophagitis present  Stable, recommended continuation of PPI  Refills for Prilosec provided  - omeprazole (PriLOSEC) 20 mg delayed release capsule; Take 1 capsule (20 mg total) by mouth daily before breakfast  Dispense: 30 capsule; Refill: 5    ______________________________________________________________________    HPI: Ms Quintin Roa Is a 56-year-old female with hypothyroidism, hypertension, aortic valve insufficiency,  Advanced polyp/? Colon cancer requiring colon resection, osteoporosis, presenting for follow up regarding change in bowel habits         She has had longstanding fluctuating bowel habits for which she has had previous evaluation including colonoscopy in 2018  At that time her colonoscopy demonstrated normal ileal colonic anastomosis, normal colonic mucosa, and biopsies of the colon were negative for microscopic colitis      Prior to her last visit she had 6 months of frequent small volume stools    Stools were formed, not watery or hard       She also has chronic heartburn for which Prilosec helps, but she does not take it on a regular basis      It was determined that small amount of stool frequently throughout the day was likely more related to constipation  She was started on MiraLax 17 g daily  Fortunately she has had significant improvement with this regimen, and is down to 2-3 larger stools per day  She is not taking the MiraLax daily as drinking it and cranberry juice currently and resulting in some mild nausea  REVIEW OF SYSTEMS:    CONSTITUTIONAL: Denies any fever, chills, rigors, and weight loss  HEENT: Denies odynophagia, tinnitus  CARDIOVASCULAR: No chest pain or palpitations  RESPIRATORY: Denies any cough, hemoptysis, shortness of breath or dyspnea on exertion  GASTROINTESTINAL: As noted in the History of Present Illness  GENITOURINARY: No problems with urination  Denies any hematuria or dysuria  NEUROLOGIC: No dizziness or vertigo, denies headaches  MUSCULOSKELETAL: Denies any muscle or joint pain  SKIN: Denies skin rashes or itching  ENDOCRINE:  Denies intolerance to heat or cold  PSYCHOSOCIAL: Denies depression or anxiety  Denies any recent memory loss  Historical Information   Past Medical History:   Diagnosis Date    Anemia     pernicious    Anxiety     Aortic regurgitation     Cancer (HCC)     colon, Last Assessed:  10/27/15    Depression     Disease of thyroid gland     GERD (gastroesophageal reflux disease)     Hyperlipidemia     Migraine     Osteoporosis      Past Surgical History:   Procedure Laterality Date    APPENDECTOMY      BREAST SURGERY Right     cyst    COLON SURGERY  1998    hemicolectomy    HYSTERECTOMY      MOHS SURGERY      head    OOPHORECTOMY      MD COLONOSCOPY FLX DX W/COLLJ SPEC WHEN PFRMD N/A 3/18/2016    Procedure: COLONOSCOPY;  Surgeon: Valdo Rivera MD;  Location: [facility];  Service: Gastroenterology    MD ESOPHAGOGASTRODUODENOSCOPY TRANSORAL DIAGNOSTIC N/A 3/18/2016    Procedure: ESOPHAGOGASTRODUODENOSCOPY (EGD);   Surgeon: Valdo Rivera MD;  Location: [facility];  Service: Gastroenterology    TONSILLECTOMY  childhood     Social History Social History     Substance and Sexual Activity   Alcohol Use Yes    Comment: social     Social History     Substance and Sexual Activity   Drug Use No     Social History     Tobacco Use   Smoking Status Never Smoker   Smokeless Tobacco Never Used     Family History   Problem Relation Age of Onset    Coronary artery disease Mother     Colon cancer Mother     Lung cancer Mother     Other Mother         Stroke syndrome    Throat cancer Father     Breast cancer Family     Coronary artery disease Family        Meds/Allergies       Current Outpatient Medications:     AZOPT 1 % ophthalmic suspension    cholestyramine (QUESTRAN) 4 g packet    levocetirizine (XYZAL) 5 MG tablet    meclizine (ANTIVERT) 12 5 MG tablet    omeprazole (PriLOSEC) 20 mg delayed release capsule    rosuvastatin (CRESTOR) 5 mg tablet    Synthroid 50 MCG tablet    timolol (TIMOPTIC) 0 5 % ophthalmic solution    Butalbital-APAP-Caffeine -40 MG CAPS    mometasone (ELOCON) 0 1 % lotion    polyethylene glycol (GLYCOLAX) 17 GM/SCOOP powder    Allergies   Allergen Reactions    Alprazolam     Avelox [Moxifloxacin]     Demerol [Meperidine]     Erythromycin     Lipitor [Atorvastatin] Diarrhea    Prednisone     Shellfish-Derived Products - Food Allergy            Objective     Blood pressure 118/66, pulse 68, temperature 98 7 °F (37 1 °C), resp  rate 16, height 5' 3" (1 6 m), weight 55 3 kg (122 lb)  Body mass index is 21 61 kg/m²  PHYSICAL EXAM:      General Appearance:   Alert, cooperative, no distress   HEENT:   Normocephalic, atraumatic, anicteric  Neck:  Supple, symmetrical, trachea midline   Lungs:   Clear to auscultation bilaterally; no rales, rhonchi or wheezing; respirations unlabored    Heart[de-identified]   Regular rate and rhythm; no murmur, rub, or gallop     Abdomen:   Soft, non-tender, non-distended; normal bowel sounds; no masses, no organomegaly    Genitalia:   Deferred    Rectal:   Deferred    Extremities:  No cyanosis, clubbing or edema    Pulses:  2+ and symmetric    Skin:  No jaundice, rashes, or lesions          Lab Results:   No visits with results within 1 Day(s) from this visit  Latest known visit with results is:   Appointment on 10/15/2021   Component Date Value    WBC 10/15/2021 4 48     RBC 10/15/2021 4 49     Hemoglobin 10/15/2021 13 6     Hematocrit 10/15/2021 42 2     MCV 10/15/2021 94     MCH 10/15/2021 30 3     MCHC 10/15/2021 32 2     RDW 10/15/2021 12 1     MPV 10/15/2021 9 5     Platelets 34/24/9712 274     nRBC 10/15/2021 0     Neutrophils Relative 10/15/2021 62     Immat GRANS % 10/15/2021 0     Lymphocytes Relative 10/15/2021 28     Monocytes Relative 10/15/2021 8     Eosinophils Relative 10/15/2021 1     Basophils Relative 10/15/2021 1     Neutrophils Absolute 10/15/2021 2 81     Immature Grans Absolute 10/15/2021 0 01     Lymphocytes Absolute 10/15/2021 1 23     Monocytes Absolute 10/15/2021 0 34     Eosinophils Absolute 10/15/2021 0 06     Basophils Absolute 10/15/2021 0 03     Sodium 10/15/2021 139     Potassium 10/15/2021 4 2     Chloride 10/15/2021 108     CO2 10/15/2021 28     ANION GAP 10/15/2021 3*    BUN 10/15/2021 10     Creatinine 10/15/2021 0 71     Glucose, Fasting 10/15/2021 97     Calcium 10/15/2021 9 5     AST 10/15/2021 13     ALT 10/15/2021 17     Alkaline Phosphatase 10/15/2021 91     Total Protein 10/15/2021 6 9     Albumin 10/15/2021 3 7     Total Bilirubin 10/15/2021 0 49     eGFR 10/15/2021 82     TSH 3RD GENERATON 10/15/2021 3 860*         Radiology Results:   No results found

## 2022-03-08 ENCOUNTER — TELEPHONE (OUTPATIENT)
Dept: SURGERY | Facility: CLINIC | Age: 77
End: 2022-03-08

## 2022-03-08 NOTE — TELEPHONE ENCOUNTER
Patient left a message returning a phone call about a voicemail that she had on her phone about rescheduling her appointment with Dr Aj Hunter  I called back and left a message to return our call again to reschedule her

## 2022-03-22 ENCOUNTER — TELEPHONE (OUTPATIENT)
Dept: FAMILY MEDICINE CLINIC | Facility: CLINIC | Age: 77
End: 2022-03-22

## 2022-03-22 NOTE — TELEPHONE ENCOUNTER
3/22 LFT MSG FOR PT TO COME AT 2 PM INSTEAD OF 3:30 ON SAME DAY 3/29    DR WILL NOT BE HERE AFTER 3 PM

## 2022-03-23 ENCOUNTER — TELEPHONE (OUTPATIENT)
Dept: FAMILY MEDICINE CLINIC | Facility: CLINIC | Age: 77
End: 2022-03-23

## 2022-03-29 ENCOUNTER — OFFICE VISIT (OUTPATIENT)
Dept: FAMILY MEDICINE CLINIC | Facility: CLINIC | Age: 77
End: 2022-03-29
Payer: MEDICARE

## 2022-03-29 VITALS
DIASTOLIC BLOOD PRESSURE: 78 MMHG | TEMPERATURE: 98 F | SYSTOLIC BLOOD PRESSURE: 126 MMHG | BODY MASS INDEX: 20.98 KG/M2 | WEIGHT: 118.4 LBS | HEIGHT: 63 IN

## 2022-03-29 DIAGNOSIS — G47.00 INSOMNIA, UNSPECIFIED TYPE: ICD-10-CM

## 2022-03-29 DIAGNOSIS — E03.9 ACQUIRED HYPOTHYROIDISM: ICD-10-CM

## 2022-03-29 DIAGNOSIS — R42 VERTIGO: ICD-10-CM

## 2022-03-29 DIAGNOSIS — Z00.00 MEDICARE ANNUAL WELLNESS VISIT, SUBSEQUENT: Primary | ICD-10-CM

## 2022-03-29 DIAGNOSIS — E78.2 MIXED HYPERLIPIDEMIA: ICD-10-CM

## 2022-03-29 PROCEDURE — 1123F ACP DISCUSS/DSCN MKR DOCD: CPT | Performed by: INTERNAL MEDICINE

## 2022-03-29 PROCEDURE — G0439 PPPS, SUBSEQ VISIT: HCPCS | Performed by: INTERNAL MEDICINE

## 2022-03-29 RX ORDER — MECLIZINE HCL 12.5 MG/1
12.5 TABLET ORAL EVERY 8 HOURS PRN
Qty: 30 TABLET | Refills: 3 | Status: SHIPPED | OUTPATIENT
Start: 2022-03-29

## 2022-03-29 RX ORDER — TRAZODONE HYDROCHLORIDE 50 MG/1
50 TABLET ORAL
Qty: 30 TABLET | Refills: 5 | Status: SHIPPED | OUTPATIENT
Start: 2022-03-29

## 2022-03-29 RX ORDER — MECLIZINE HCL 12.5 MG/1
12.5 TABLET ORAL EVERY 8 HOURS PRN
Qty: 30 TABLET | Refills: 3 | Status: SHIPPED | OUTPATIENT
Start: 2022-03-29 | End: 2022-03-29 | Stop reason: SDUPTHER

## 2022-03-29 NOTE — PROGRESS NOTES
Assessment and Plan:     Problem List Items Addressed This Visit        Endocrine    Hypothyroidism    Relevant Orders    TSH, 3rd generation       Other    Hyperlipidemia    Relevant Orders    Lipid panel    Comprehensive metabolic panel    Medicare annual wellness visit, subsequent - Primary      Other Visit Diagnoses     Vertigo        Relevant Medications    meclizine (ANTIVERT) 12 5 MG tablet    Insomnia, unspecified type        Relevant Medications    traZODone (DESYREL) 50 mg tablet      Pt looking into moving closer to her daughter and seems to getting closer to that  Exercise rx for fbw  Eye exam utd  Mammo was done January thru [facility]   RTo 6months or pt will update if moving prior  She will consider additional covid booster and prevnar 20 when we have that   Pt has ACP documents and recommended bring copy for our chart to scan     Falls Plan of Care: balance, strength, and gait training instructions were provided  Preventive health issues were discussed with patient, and age appropriate screening tests were ordered as noted in patient's After Visit Summary  Personalized health advice and appropriate referrals for health education or preventive services given if needed, as noted in patient's After Visit Summary       History of Present Illness:     Patient presents for Medicare Annual Wellness visit    Patient Care Team:  Camilla Mike DO as PCP - DO Rm Lua MD Kevon Sida, MD as Endoscopist  Mary Lubin DO (Gastroenterology)     Problem List:     Patient Active Problem List   Diagnosis    Allodynia    Esophageal reflux    Glaucoma    Hyperlipidemia    Hypothyroidism    Irritable bowel syndrome    Migraine, unspecified, not intractable, without status migrainosus    Osteoporosis    Pernicious anemia    Medicare annual wellness visit, subsequent    Glaucoma of both eyes    Visual loss, left eye    Flu vaccine need    Dermatitis  Sleep disorder    Syncope    Acute bilateral low back pain without sciatica    Nonrheumatic aortic valve insufficiency    Benign essential HTN      Past Medical and Surgical History:     Past Medical History:   Diagnosis Date    Anemia     pernicious    Anxiety     Aortic regurgitation     Cancer (HCC)     colon, Last Assessed:  10/27/15    Depression     Disease of thyroid gland     GERD (gastroesophageal reflux disease)     Hyperlipidemia     Migraine     Osteoporosis      Past Surgical History:   Procedure Laterality Date    APPENDECTOMY      BREAST SURGERY Right     cyst    COLON SURGERY  1998    hemicolectomy    HYSTERECTOMY      MOHS SURGERY      head    OOPHORECTOMY      FL COLONOSCOPY FLX DX W/COLLJ SPEC WHEN PFRMD N/A 3/18/2016    Procedure: COLONOSCOPY;  Surgeon: Atiya Carl MD;  Location: [facility];  Service: Gastroenterology    FL ESOPHAGOGASTRODUODENOSCOPY TRANSORAL DIAGNOSTIC N/A 3/18/2016    Procedure: ESOPHAGOGASTRODUODENOSCOPY (EGD);   Surgeon: Atiya Carl MD;  Location: [facility];  Service: Gastroenterology    TONSILLECTOMY  childhood      Family History:     Family History   Problem Relation Age of Onset    Coronary artery disease Mother     Colon cancer Mother     Lung cancer Mother     Other Mother         Stroke syndrome    Throat cancer Father     Breast cancer Family     Coronary artery disease Family       Social History:     Social History     Socioeconomic History    Marital status: /Civil Union     Spouse name: None    Number of children: None    Years of education: None    Highest education level: None   Occupational History    None   Tobacco Use    Smoking status: Never Smoker    Smokeless tobacco: Never Used   Vaping Use    Vaping Use: Never used   Substance and Sexual Activity    Alcohol use: Yes     Comment: social    Drug use: No    Sexual activity: None   Other Topics Concern    None   Social History Narrative    Always uses sunscreen    Caffeine use- 2-3 cups of coffee 1/2 and 1/2    Dental care, regularly    Lives independently with spouse    Uses safety equipment - seatbelts     Social Determinants of Health     Financial Resource Strain: Not on file   Food Insecurity: Not on file   Transportation Needs: Not on file   Physical Activity: Not on file   Stress: Not on file   Social Connections: Not on file   Intimate Partner Violence: Not on file   Housing Stability: Not on file      Medications and Allergies:     Current Outpatient Medications   Medication Sig Dispense Refill    AZOPT 1 % ophthalmic suspension       meclizine (ANTIVERT) 12 5 MG tablet Take 1 tablet (12 5 mg total) by mouth every 8 (eight) hours as needed for dizziness 30 tablet 3    omeprazole (PriLOSEC) 20 mg delayed release capsule Take 1 capsule (20 mg total) by mouth daily before breakfast 30 capsule 5    rosuvastatin (CRESTOR) 5 mg tablet Take 1 tablet (5 mg total) by mouth daily 90 tablet 3    Synthroid 50 MCG tablet Take 1 tablet (50 mcg total) by mouth daily 90 tablet 3    timolol (TIMOPTIC) 0 5 % ophthalmic solution Administer to the right eye   0    levocetirizine (XYZAL) 5 MG tablet Take 1 tablet (5 mg total) by mouth every evening (Patient not taking: Reported on 3/29/2022 ) 30 tablet 5    traZODone (DESYREL) 50 mg tablet Take 1 tablet (50 mg total) by mouth daily at bedtime 30 tablet 5     No current facility-administered medications for this visit       Allergies   Allergen Reactions    Alprazolam     Avelox [Moxifloxacin]     Demerol [Meperidine]     Erythromycin     Lipitor [Atorvastatin] Diarrhea    Prednisone     Shellfish-Derived Products - Food Allergy       Immunizations:     Immunization History   Administered Date(s) Administered    COVID-19 MODERNA VACC 0 5 ML IM 01/18/2021, 02/15/2021    INFLUENZA 01/01/2008    Influenza Quadrivalent Preservative Free 3 years and older IM 10/27/2015    Influenza Split High Dose Preservative Free IM 08/30/2017    Influenza, high dose seasonal 0 7 mL 11/06/2018, 10/23/2019, 10/08/2020, 10/15/2021    Influenza, seasonal, injectable 11/15/2013, 10/23/2014, 12/08/2016    Pneumococcal Conjugate 13-Valent 01/08/2019    Pneumococcal Polysaccharide PPV23 01/01/2007    Td (adult), adsorbed 01/01/2000    Zoster 12/17/2009      Health Maintenance:         Topic Date Due    Breast Cancer Screening: Mammogram  11/18/2021    Colorectal Cancer Screening  01/22/2024    Hepatitis C Screening  Completed         Topic Date Due    DTaP,Tdap,and Td Vaccines (1 - Tdap) 01/02/2000    Pneumococcal Vaccine: 65+ Years (2 of 2 - PPSV23) 01/08/2020    COVID-19 Vaccine (3 - Booster for Moderna series) 07/15/2021      Medicare Health Risk Assessment:     /78   Temp 98 °F (36 7 °C) (Temporal)   Ht 5' 3" (1 6 m)   Wt 53 7 kg (118 lb 6 4 oz)   BMI 20 97 kg/m²      Antonino Villarreal is here for her Subsequent Wellness visit  Last Medicare Wellness visit information reviewed, patient interviewed, no change since last AWV  Health Risk Assessment:   Patient rates overall health as very good  Patient feels that their physical health rating is same  Patient is very satisfied with their life  Eyesight was rated as slightly worse  Hearing was rated as same  Patient feels that their emotional and mental health rating is same  Patients states they are never, rarely angry  Patient states they are never, rarely unusually tired/fatigued  Pain experienced in the last 7 days has been none  Patient states that she has experienced no weight loss or gain in last 6 months  Depression Screening:   PHQ-2 Score: 0  PHQ-9 Score: 4      Fall Risk Screening: In the past year, patient has experienced: no history of falling in past year      Urinary Incontinence Screening:   Patient has not leaked urine accidently in the last six months  Home Safety:  Patient does not have trouble with stairs inside or outside of their home   Patient has working smoke alarms and has working carbon monoxide detector  Home safety hazards include: none  Nutrition:   Current diet is Regular  Medications:   Patient is currently taking over-the-counter supplements  OTC medications include: see medication list  Patient is able to manage medications  Activities of Daily Living (ADLs)/Instrumental Activities of Daily Living (IADLs):   Walk and transfer into and out of bed and chair?: Yes  Dress and groom yourself?: Yes    Bathe or shower yourself?: Yes    Feed yourself? Yes  Do your laundry/housekeeping?: Yes  Manage your money, pay your bills and track your expenses?: Yes  Make your own meals?: Yes    Do your own shopping?: Yes    Previous Hospitalizations:   Any hospitalizations or ED visits within the last 12 months?: No      Advance Care Planning:   Living will: Yes    Durable POA for healthcare:  Yes    Advanced directive: Yes    End of Life Decisions reviewed with patient: Yes    Provider agrees with end of life decisions: Yes      Cognitive Screening:   Provider or family/friend/caregiver concerned regarding cognition?: No    PREVENTIVE SCREENINGS      Cardiovascular Screening:    General: History Lipid Disorder and Risks and Benefits Discussed    Due for: Lipid Panel      Diabetes Screening:     General: Risks and Benefits Discussed    Due for: Blood Glucose      Colorectal Cancer Screening:     General: Screening Current      Breast Cancer Screening:     General: Screening Current      Cervical Cancer Screening:    General: Screening Not Indicated      Osteoporosis Screening:    General: Screening Not Indicated and History Osteoporosis      Abdominal Aortic Aneurysm (AAA) Screening:        General: Screening Not Indicated      Lung Cancer Screening:     General: Screening Not Indicated      Hepatitis C Screening:    General: Screening Current    Screening, Brief Intervention, and Referral to Treatment (SBIRT)    Screening  Typical number of drinks in a day: 0  Typical number of drinks in a week: 0  Interpretation: Low risk drinking behavior  AUDIT-C Screenin) How often did you have a drink containing alcohol in the past year? never  2) How many drinks did you have on a typical day when you were drinking in the past year? 0  3) How often did you have 6 or more drinks on one occasion in the past year? never    AUDIT-C Score: 0  Interpretation: Score 0-2 (female): Negative screen for alcohol misuse    Single Item Drug Screening:  How often have you used an illegal drug (including marijuana) or a prescription medication for non-medical reasons in the past year? never    Single Item Drug Screen Score: 0  Interpretation: Negative screen for possible drug use disorder    Brief Intervention  Alcohol & drug use screenings were reviewed  No concerns regarding substance use disorder identified  Other Counseling Topics:   Calcium and vitamin D intake and regular weightbearing exercise         Nawaf Conroy, DO

## 2022-03-29 NOTE — PATIENT INSTRUCTIONS
Medicare Preventive Visit Patient Instructions  Thank you for completing your Welcome to Medicare Visit or Medicare Annual Wellness Visit today  Your next wellness visit will be due in one year (3/30/2023)  The screening/preventive services that you may require over the next 5-10 years are detailed below  Some tests may not apply to you based off risk factors and/or age  Screening tests ordered at today's visit but not completed yet may show as past due  Also, please note that scanned in results may not display below  Preventive Screenings:  Service Recommendations Previous Testing/Comments   Colorectal Cancer Screening  * Colonoscopy    * Fecal Occult Blood Test (FOBT)/Fecal Immunochemical Test (FIT)  * Fecal DNA/Cologuard Test  * Flexible Sigmoidoscopy Age: 54-65 years old   Colonoscopy: every 10 years (may be performed more frequently if at higher risk)  OR  FOBT/FIT: every 1 year  OR  Cologuard: every 3 years  OR  Sigmoidoscopy: every 5 years  Screening may be recommended earlier than age 48 if at higher risk for colorectal cancer  Also, an individualized decision between you and your healthcare provider will decide whether screening between the ages of 74-80 would be appropriate  Colonoscopy: 01/22/2019  FOBT/FIT: Not on file  Cologuard: Not on file  Sigmoidoscopy: Not on file    Screening Current     Breast Cancer Screening Age: 36 years old  Frequency: every 1-2 years  Not required if history of left and right mastectomy Mammogram: 11/18/2020    Screening Current   Cervical Cancer Screening Between the ages of 21-29, pap smear recommended once every 3 years  Between the ages of 33-67, can perform pap smear with HPV co-testing every 5 years     Recommendations may differ for women with a history of total hysterectomy, cervical cancer, or abnormal pap smears in past  Pap Smear: Not on file    Screening Not Indicated   Hepatitis C Screening Once for adults born between 1945 and 1965  More frequently in patients at high risk for Hepatitis C Hep C Antibody: 06/22/2021    Screening Current   Diabetes Screening 1-2 times per year if you're at risk for diabetes or have pre-diabetes Fasting glucose: 97 mg/dL   A1C: 5 7 %    Screening Current   Cholesterol Screening Once every 5 years if you don't have a lipid disorder  May order more often based on risk factors  Lipid panel: 06/22/2021    Screening Not Indicated  History Lipid Disorder     Other Preventive Screenings Covered by Medicare:  1  Abdominal Aortic Aneurysm (AAA) Screening: covered once if your at risk  You're considered to be at risk if you have a family history of AAA  2  Lung Cancer Screening: covers low dose CT scan once per year if you meet all of the following conditions: (1) Age 50-69; (2) No signs or symptoms of lung cancer; (3) Current smoker or have quit smoking within the last 15 years; (4) You have a tobacco smoking history of at least 30 pack years (packs per day multiplied by number of years you smoked); (5) You get a written order from a healthcare provider  3  Glaucoma Screening: covered annually if you're considered high risk: (1) You have diabetes OR (2) Family history of glaucoma OR (3)  aged 48 and older OR (3)  American aged 72 and older  3  Osteoporosis Screening: covered every 2 years if you meet one of the following conditions: (1) You're estrogen deficient and at risk for osteoporosis based off medical history and other findings; (2) Have a vertebral abnormality; (3) On glucocorticoid therapy for more than 3 months; (4) Have primary hyperparathyroidism; (5) On osteoporosis medications and need to assess response to drug therapy  · Last bone density test (DXA Scan): Not on file  5  HIV Screening: covered annually if you're between the age of 12-76  Also covered annually if you are younger than 13 and older than 72 with risk factors for HIV infection   For pregnant patients, it is covered up to 3 times per pregnancy  Immunizations:  Immunization Recommendations   Influenza Vaccine Annual influenza vaccination during flu season is recommended for all persons aged >= 6 months who do not have contraindications   Pneumococcal Vaccine (Prevnar and Pneumovax)  * Prevnar = PCV13  * Pneumovax = PPSV23   Adults 25-60 years old: 1-3 doses may be recommended based on certain risk factors  Adults 72 years old: Prevnar (PCV13) vaccine recommended followed by Pneumovax (PPSV23) vaccine  If already received PPSV23 since turning 65, then PCV13 recommended at least one year after PPSV23 dose  Hepatitis B Vaccine 3 dose series if at intermediate or high risk (ex: diabetes, end stage renal disease, liver disease)   Tetanus (Td) Vaccine - COST NOT COVERED BY MEDICARE PART B Following completion of primary series, a booster dose should be given every 10 years to maintain immunity against tetanus  Td may also be given as tetanus wound prophylaxis  Tdap Vaccine - COST NOT COVERED BY MEDICARE PART B Recommended at least once for all adults  For pregnant patients, recommended with each pregnancy  Shingles Vaccine (Shingrix) - COST NOT COVERED BY MEDICARE PART B  2 shot series recommended in those aged 48 and above     Health Maintenance Due:      Topic Date Due    Breast Cancer Screening: Mammogram  11/18/2021    Colorectal Cancer Screening  01/22/2024    Hepatitis C Screening  Completed     Immunizations Due:      Topic Date Due    DTaP,Tdap,and Td Vaccines (1 - Tdap) 01/02/2000    Pneumococcal Vaccine: 65+ Years (2 of 2 - PPSV23) 01/08/2020    COVID-19 Vaccine (3 - Booster for Troy Naegeli series) 07/15/2021     Advance Directives   What are advance directives? Advance directives are legal documents that state your wishes and plans for medical care  These plans are made ahead of time in case you lose your ability to make decisions for yourself   Advance directives can apply to any medical decision, such as the treatments you want, and if you want to donate organs  What are the types of advance directives? There are many types of advance directives, and each state has rules about how to use them  You may choose a combination of any of the following:  · Living will: This is a written record of the treatment you want  You can also choose which treatments you do not want, which to limit, and which to stop at a certain time  This includes surgery, medicine, IV fluid, and tube feedings  · Durable power of  for healthcare [facility]): This is a written record that states who you want to make healthcare choices for you when you are unable to make them for yourself  This person, called a proxy, is usually a family member or a friend  You may choose more than 1 proxy  · Do not resuscitate (DNR) order:  A DNR order is used in case your heart stops beating or you stop breathing  It is a request not to have certain forms of treatment, such as CPR  A DNR order may be included in other types of advance directives  · Medical directive: This covers the care that you want if you are in a coma, near death, or unable to make decisions for yourself  You can list the treatments you want for each condition  Treatment may include pain medicine, surgery, blood transfusions, dialysis, IV or tube feedings, and a ventilator (breathing machine)  · Values history: This document has questions about your views, beliefs, and how you feel and think about life  This information can help others choose the care that you would choose  Why are advance directives important? An advance directive helps you control your care  Although spoken wishes may be used, it is better to have your wishes written down  Spoken wishes can be misunderstood, or not followed  Treatments may be given even if you do not want them  An advance directive may make it easier for your family to make difficult choices about your care        © Copyright [employer] 2018 Information is for End User's use only and may not be sold, redistributed or otherwise used for commercial purposes   All illustrations and images included in CareNotes® are the copyrighted property of A D A M , Inc  or Alexia Garcia

## 2022-03-30 ENCOUNTER — TELEPHONE (OUTPATIENT)
Dept: ADMINISTRATIVE | Facility: OTHER | Age: 77
End: 2022-03-30

## 2022-03-30 NOTE — TELEPHONE ENCOUNTER
----- Message from Damir Waterman sent at 3/29/2022  2:52 PM EDT -----  Regarding: Mammogram  03/29/22 2:53 PM    Hello, our patient Paolo Graff has had Mammogram completed/performed  Please assist in updating the patient chart by pulling the Care Everywhere (CE) document  The date of service is January of 2022       Thank you,  Mary Macedo   [facility]

## 2022-04-20 DIAGNOSIS — E03.9 ACQUIRED HYPOTHYROIDISM: ICD-10-CM

## 2022-04-20 RX ORDER — LEVOTHYROXINE SODIUM 50 MCG
TABLET ORAL
Qty: 90 TABLET | Refills: 3 | Status: SHIPPED | OUTPATIENT
Start: 2022-04-20

## 2022-05-05 ENCOUNTER — TELEPHONE (OUTPATIENT)
Dept: FAMILY MEDICINE CLINIC | Facility: CLINIC | Age: 77
End: 2022-05-05

## 2022-05-05 NOTE — TELEPHONE ENCOUNTER
If dizziness resolved - can order meclizine if she wants to have but would not take unless dizziness persistent  Would setup appt to check BP etc Make sure staying hydrated and not changing position quickly which can cause sxs

## 2022-10-19 ENCOUNTER — OFFICE VISIT (OUTPATIENT)
Dept: FAMILY MEDICINE CLINIC | Facility: CLINIC | Age: 77
End: 2022-10-19
Payer: MEDICARE

## 2022-10-19 VITALS
SYSTOLIC BLOOD PRESSURE: 130 MMHG | HEART RATE: 76 BPM | RESPIRATION RATE: 18 BRPM | DIASTOLIC BLOOD PRESSURE: 70 MMHG | HEIGHT: 63 IN | BODY MASS INDEX: 20.91 KG/M2 | WEIGHT: 118 LBS | TEMPERATURE: 97.7 F

## 2022-10-19 DIAGNOSIS — E78.2 MIXED HYPERLIPIDEMIA: ICD-10-CM

## 2022-10-19 DIAGNOSIS — I35.1 NONRHEUMATIC AORTIC VALVE INSUFFICIENCY: ICD-10-CM

## 2022-10-19 DIAGNOSIS — Z23 IMMUNIZATION DUE: ICD-10-CM

## 2022-10-19 DIAGNOSIS — I10 BENIGN ESSENTIAL HTN: Chronic | ICD-10-CM

## 2022-10-19 DIAGNOSIS — Z23 NEED FOR IMMUNIZATION AGAINST INFLUENZA: Primary | ICD-10-CM

## 2022-10-19 DIAGNOSIS — E03.9 ACQUIRED HYPOTHYROIDISM: ICD-10-CM

## 2022-10-19 PROBLEM — L30.9 DERMATITIS: Status: RESOLVED | Noted: 2020-09-16 | Resolved: 2022-10-19

## 2022-10-19 PROBLEM — M54.50 ACUTE BILATERAL LOW BACK PAIN WITHOUT SCIATICA: Status: RESOLVED | Noted: 2021-06-18 | Resolved: 2022-10-19

## 2022-10-19 PROBLEM — R55 SYNCOPE: Chronic | Status: RESOLVED | Noted: 2021-06-18 | Resolved: 2022-10-19

## 2022-10-19 PROCEDURE — G0009 ADMIN PNEUMOCOCCAL VACCINE: HCPCS | Performed by: INTERNAL MEDICINE

## 2022-10-19 PROCEDURE — G0008 ADMIN INFLUENZA VIRUS VAC: HCPCS | Performed by: INTERNAL MEDICINE

## 2022-10-19 PROCEDURE — 99214 OFFICE O/P EST MOD 30 MIN: CPT | Performed by: INTERNAL MEDICINE

## 2022-10-19 PROCEDURE — 90662 IIV NO PRSV INCREASED AG IM: CPT | Performed by: INTERNAL MEDICINE

## 2022-10-19 PROCEDURE — 90677 PCV20 VACCINE IM: CPT | Performed by: INTERNAL MEDICINE

## 2022-10-19 NOTE — PROGRESS NOTES
Name: Onel Yousif      : 1945      MRN: [id number]  Encounter Provider: Raghav Silverman DO  Encounter Date: 10/19/2022   Encounter department: [address] [place] [address] Road     1  Need for immunization against influenza  -     influenza vaccine, high-dose, PF 0 7 mL (FLUZONE HIGH-DOSE)  Flu shot today   Prevnar 20     2  Benign essential HTN  Continue current rx No added salt diet Pt does walk for exercise exercise     3  Acquired hypothyroidism  She has rx for labs    4  Mixed hyperlipidemia  Low fat diet and continued exercise     5  Nonrheumatic aortic valve insufficiency  -     Echo complete w/ contrast if indicated; Future; Expected date: 10/19/2022  Recheck echo especially since patient has episodes of dizziness and last echo over a year  ago Stay hydrated    Rto 6 months     Depression Screening and Follow-up Plan: Patient was screened for depression during today's encounter  They screened negative with a PHQ-2 score of 0  Subjective      HPI   Patient doing ok overall She still has occasional episodes of dizziness No chest pain No sob She does walk at times No falls Back sxs are managed to some degree and not limiting No acute sxs She does want flu shot today She is still considering relocating to [place] near her daughter if they can find a place   Review of Systems   Constitutional: Negative for chills and fever  HENT: Negative  Eyes: Negative for visual disturbance  Respiratory: Negative for cough and shortness of breath  Cardiovascular: Negative for chest pain, palpitations and leg swelling  Gastrointestinal: Negative for abdominal distention and abdominal pain  Genitourinary: Negative  Musculoskeletal: Negative  Neurological: Negative for dizziness, light-headedness and headaches  Psychiatric/Behavioral: Negative for sleep disturbance  The patient is not nervous/anxious          Current Outpatient Medications on File Prior to Visit   Medication Sig   • azelastine (ASTELIN) 0 1 % nasal spray 2 sprays into each nostril 2 (two) times a day Use in each nostril as directed   • AZOPT 1 % ophthalmic suspension    • meclizine (ANTIVERT) 12 5 MG tablet Take 1 tablet (12 5 mg total) by mouth every 8 (eight) hours as needed for dizziness   • omeprazole (PriLOSEC) 20 mg delayed release capsule Take 1 capsule (20 mg total) by mouth daily before breakfast   • rosuvastatin (CRESTOR) 5 mg tablet Take 1 tablet (5 mg total) by mouth daily   • Synthroid 50 MCG tablet take 1 tablet by mouth once daily   • timolol (TIMOPTIC) 0 5 % ophthalmic solution Administer to the right eye    • traZODone (DESYREL) 50 mg tablet Take 1 tablet (50 mg total) by mouth daily at bedtime   • levocetirizine (XYZAL) 5 MG tablet Take 1 tablet (5 mg total) by mouth every evening (Patient not taking: No sig reported)       Objective     /70   Pulse 76   Temp 97 7 °F (36 5 °C) (Temporal)   Resp 18   Ht 5' 3" (1 6 m)   Wt 53 5 kg (118 lb)   BMI 20 90 kg/m²     Physical Exam  Vitals reviewed  Constitutional:       General: She is not in acute distress  Appearance: Normal appearance  She is not ill-appearing, toxic-appearing or diaphoretic  HENT:      Head: Normocephalic and atraumatic  Right Ear: External ear normal       Left Ear: External ear normal       Nose: Nose normal       Mouth/Throat:      Mouth: Mucous membranes are dry  Eyes:      General: No scleral icterus  Extraocular Movements: Extraocular movements intact  Conjunctiva/sclera: Conjunctivae normal       Pupils: Pupils are equal, round, and reactive to light  Cardiovascular:      Rate and Rhythm: Normal rate and regular rhythm  Pulses: Normal pulses  Heart sounds: Normal heart sounds  Pulmonary:      Effort: Pulmonary effort is normal  No respiratory distress  Breath sounds: Normal breath sounds  No wheezing  Abdominal:      General: Bowel sounds are normal  There is no distension  Palpations: Abdomen is soft  Tenderness: There is no abdominal tenderness  Musculoskeletal:      Cervical back: Normal range of motion and neck supple  No rigidity  Right lower leg: No edema  Left lower leg: No edema  Lymphadenopathy:      Cervical: No cervical adenopathy  Skin:     General: Skin is dry  Coloration: Skin is not jaundiced or pale  Neurological:      General: No focal deficit present  Mental Status: She is alert and oriented to person, place, and time  Mental status is at baseline  Psychiatric:         Mood and Affect: Mood normal          Behavior: Behavior normal          Thought Content:  Thought content normal          Judgment: Judgment normal        Soco Spencer,

## 2022-10-28 DIAGNOSIS — E78.2 MIXED HYPERLIPIDEMIA: ICD-10-CM

## 2022-10-28 RX ORDER — ROSUVASTATIN CALCIUM 5 MG/1
TABLET, COATED ORAL
Qty: 90 TABLET | Refills: 3 | Status: SHIPPED | OUTPATIENT
Start: 2022-10-28

## 2022-11-03 ENCOUNTER — HOSPITAL ENCOUNTER (OUTPATIENT)
Dept: NON INVASIVE DIAGNOSTICS | Facility: HOSPITAL | Age: 77
Discharge: HOME | End: 2022-11-03
Attending: INTERNAL MEDICINE

## 2022-11-03 VITALS
BODY MASS INDEX: 20.9 KG/M2 | SYSTOLIC BLOOD PRESSURE: 138 MMHG | DIASTOLIC BLOOD PRESSURE: 61 MMHG | HEART RATE: 60 BPM | HEIGHT: 63 IN | WEIGHT: 117.95 LBS

## 2022-11-03 DIAGNOSIS — I35.1 NONRHEUMATIC AORTIC VALVE INSUFFICIENCY: ICD-10-CM

## 2022-11-03 LAB
AORTIC ROOT: 3.7 CM
ASCENDING AORTA: 3.7 CM
AV LVOT MEAN GRADIENT: 2 MMHG
AV LVOT PEAK GRADIENT: 3 MMHG
AV REGURGITATION PRESSURE HALF TIME: 722 MS
DOP CALC LVOT AREA: 3.14 CM2
DOP CALC LVOT DIAMETER: 2 CM
DOP CALC LVOT PEAK VEL VTI: 20.11 CM
DOP CALC LVOT PEAK VEL: 0.87 M/S
DOP CALC LVOT STROKE INDEX: 40.6 ML/M2
DOP CALC LVOT STROKE VOLUME: 63.15 CM3
E WAVE DECELERATION TIME: 186 MS
FRACTIONAL SHORTENING: 30 % (ref 28–44)
INTERVENTRICULAR SEPTUM IN DIASTOLE (PARASTERNAL SHORT AXIS VIEW): 0.7 CM
INTERVENTRICULAR SEPTUM: 0.7 CM (ref 0.6–1.1)
LAAS-AP2: 15.9 CM2
LAAS-AP4: 13.3 CM2
LEFT ATRIUM SIZE: 2.8 CM
LEFT INTERNAL DIMENSION IN SYSTOLE: 4 CM (ref 2.1–4)
LEFT VENTRICULAR INTERNAL DIMENSION IN DIASTOLE: 5.7 CM (ref 3.5–6)
LEFT VENTRICULAR POSTERIOR WALL IN END DIASTOLE: 0.7 CM
LEFT VENTRICULAR STROKE VOLUME: 89 ML
LVSV (TEICH): 89 ML
MV PEAK A VEL: 0.71 M/S
MV PEAK E VEL: 61 CM/S
MV STENOSIS PRESSURE HALF TIME: 54 MS
MV VALVE AREA P 1/2 METHOD: 4.07 CM2
RIGHT ATRIUM AREA SYSTOLE A4C: 12.3 CM2
RIGHT VENTRICLE ID DIMENSION: 2.5 CM
SL CV AV DECELERATION TIME RETROGRADE: 2488 MS
SL CV AV PEAK GRADIENT RETROGRADE: 90 MMHG
SL CV LEFT ATRIUM LENGTH A2C: 4.3 CM
SL CV LV EF: 60
SL CV PED ECHO LEFT VENTRICLE DIASTOLIC VOLUME (MOD BIPLANE) 2D: 160 ML
SL CV PED ECHO LEFT VENTRICLE SYSTOLIC VOLUME (MOD BIPLANE) 2D: 71 ML
TR MAX PG: 19 MMHG
TR PEAK VELOCITY: 2.2 M/S
TRICUSPID VALVE PEAK REGURGITATION VELOCITY: 2.18 M/S

## 2023-01-17 DIAGNOSIS — E03.9 ACQUIRED HYPOTHYROIDISM: ICD-10-CM

## 2023-01-17 RX ORDER — LEVOTHYROXINE SODIUM 50 MCG
50 TABLET ORAL DAILY
Qty: 90 TABLET | Refills: 3 | Status: SHIPPED | OUTPATIENT
Start: 2023-01-17

## 2023-03-13 ENCOUNTER — APPOINTMENT (OUTPATIENT)
Dept: LAB | Facility: CLINIC | Age: 78
End: 2023-03-13

## 2023-03-13 DIAGNOSIS — E03.9 ACQUIRED HYPOTHYROIDISM: ICD-10-CM

## 2023-03-13 DIAGNOSIS — E78.2 MIXED HYPERLIPIDEMIA: ICD-10-CM

## 2023-03-13 LAB
ALBUMIN SERPL BCP-MCNC: 3.9 G/DL (ref 3.5–5)
ALP SERPL-CCNC: 83 U/L (ref 46–116)
ALT SERPL W P-5'-P-CCNC: 20 U/L (ref 12–78)
ANION GAP SERPL CALCULATED.3IONS-SCNC: 6 MMOL/L (ref 4–13)
AST SERPL W P-5'-P-CCNC: 24 U/L (ref 5–45)
BILIRUB SERPL-MCNC: 0.63 MG/DL (ref 0.2–1)
BUN SERPL-MCNC: 12 MG/DL (ref 5–25)
CALCIUM SERPL-MCNC: 9.6 MG/DL (ref 8.3–10.1)
CHLORIDE SERPL-SCNC: 105 MMOL/L (ref 96–108)
CHOLEST SERPL-MCNC: 171 MG/DL
CO2 SERPL-SCNC: 26 MMOL/L (ref 21–32)
CREAT SERPL-MCNC: 0.74 MG/DL (ref 0.6–1.3)
GFR SERPL CREATININE-BSD FRML MDRD: 77 ML/MIN/1.73SQ M
GLUCOSE P FAST SERPL-MCNC: 95 MG/DL (ref 65–99)
HDLC SERPL-MCNC: 67 MG/DL
LDLC SERPL CALC-MCNC: 86 MG/DL (ref 0–100)
NONHDLC SERPL-MCNC: 104 MG/DL
POTASSIUM SERPL-SCNC: 4.1 MMOL/L (ref 3.5–5.3)
PROT SERPL-MCNC: 6.5 G/DL (ref 6.4–8.4)
SODIUM SERPL-SCNC: 137 MMOL/L (ref 135–147)
TRIGL SERPL-MCNC: 91 MG/DL
TSH SERPL DL<=0.05 MIU/L-ACNC: 4.64 UIU/ML (ref 0.45–4.5)

## 2023-07-24 DIAGNOSIS — E78.2 MIXED HYPERLIPIDEMIA: ICD-10-CM

## 2023-07-24 RX ORDER — ROSUVASTATIN CALCIUM 5 MG/1
5 TABLET, COATED ORAL DAILY
Qty: 90 TABLET | Refills: 3 | Status: SHIPPED | OUTPATIENT
Start: 2023-07-24

## 2023-11-15 ENCOUNTER — OFFICE VISIT (OUTPATIENT)
Dept: FAMILY MEDICINE CLINIC | Facility: CLINIC | Age: 78
End: 2023-11-15
Payer: MEDICARE

## 2023-11-15 VITALS — BODY MASS INDEX: 20.2 KG/M2 | TEMPERATURE: 97.8 F | HEIGHT: 63 IN | WEIGHT: 114 LBS

## 2023-11-15 DIAGNOSIS — E78.2 MIXED HYPERLIPIDEMIA: ICD-10-CM

## 2023-11-15 DIAGNOSIS — K21.9 GASTROESOPHAGEAL REFLUX DISEASE, UNSPECIFIED WHETHER ESOPHAGITIS PRESENT: ICD-10-CM

## 2023-11-15 DIAGNOSIS — I10 BENIGN ESSENTIAL HTN: Chronic | ICD-10-CM

## 2023-11-15 DIAGNOSIS — E03.9 HYPOTHYROIDISM, UNSPECIFIED TYPE: Primary | ICD-10-CM

## 2023-11-15 PROCEDURE — 99214 OFFICE O/P EST MOD 30 MIN: CPT | Performed by: INTERNAL MEDICINE

## 2023-11-15 RX ORDER — OMEPRAZOLE 20 MG/1
20 CAPSULE, DELAYED RELEASE ORAL
Qty: 90 CAPSULE | Refills: 3 | Status: SHIPPED | OUTPATIENT
Start: 2023-11-15

## 2023-11-15 NOTE — PROGRESS NOTES
Name: Araceli Goodrich      : 1945      MRN: [id number]  Encounter Provider: Ap Leyva DO  Encounter Date: 11/15/2023   Encounter department: [address]     1. Hypothyroidism, unspecified type  -     TSH, 3rd generation with Free T4 reflex; Future  Stable on current rx Recheck labs     2. Gastroesophageal reflux disease, unspecified whether esophagitis present  -     omeprazole (PriLOSEC) 20 mg delayed release capsule; Take 1 capsule (20 mg total) by mouth daily before breakfast  -     Comprehensive metabolic panel; Future  -     CBC and Platelet; Future  Stable on ppi daily Monitor trigger foods and limit/avoid  Exercise as able  Pt aware colonoscopy slated for January but she will decide if repeating - no sxs     3. Mixed hyperlipidemia  -     LDL cholesterol, direct; Future  -     Comprehensive metabolic panel; Future  Low fat diet and exercise     4. Benign essential HTN  Lo sodium diet Stay hydrated         Depression Screening and Follow-up Plan: Patient was screened for depression during today's encounter. They screened negative with a PHQ-2 score of 0. Rto April/  Pt and her  will be looking to sell their home and move closer to their daughter in [place] likely in spring     Subjective      HPI  Pt doing ok overall she and her  have decided to sell their home and move to [facility] [place] in [place] near their daughter They had house up for sale over the summer but did not sell yet so will relist in spring Pt is waking as weather allows No chest pain or sob No acute illness sxs Some gerd at times which may be linked to diet She is looking forward to being closer to her daughter   Review of Systems   Constitutional:  Negative for activity change, appetite change, chills, fatigue and fever. HENT: Negative. Eyes:  Negative for visual disturbance. Respiratory:  Negative for cough and shortness of breath. Cardiovascular: Negative.   Negative for chest pain. Gastrointestinal: Negative. Negative for abdominal distention and abdominal pain. Genitourinary: Negative. Musculoskeletal:  Positive for arthralgias. Neurological:  Negative for dizziness, light-headedness and headaches. Psychiatric/Behavioral:  Negative for sleep disturbance. The patient is not nervous/anxious. Current Outpatient Medications on File Prior to Visit   Medication Sig   • AZOPT 1 % ophthalmic suspension    • levocetirizine (XYZAL) 5 MG tablet Take 1 tablet (5 mg total) by mouth every evening (Patient taking differently: Take 5 mg by mouth every evening Take po daily prn)   • meclizine (ANTIVERT) 12.5 MG tablet Take 1 tablet (12.5 mg total) by mouth every 8 (eight) hours as needed for dizziness   • rosuvastatin (CRESTOR) 5 mg tablet Take 1 tablet (5 mg total) by mouth daily   • Synthroid 50 MCG tablet Take 1 tablet (50 mcg total) by mouth daily   • timolol (TIMOPTIC) 0.5 % ophthalmic solution Administer to the right eye    • [DISCONTINUED] omeprazole (PriLOSEC) 20 mg delayed release capsule Take 1 capsule (20 mg total) by mouth daily before breakfast   • [DISCONTINUED] azelastine (ASTELIN) 0.1 % nasal spray 2 sprays into each nostril 2 (two) times a day Use in each nostril as directed (Patient not taking: Reported on 11/15/2023)       Objective     Temp 97.8 °F (36.6 °C) (Temporal)   Ht 5' 3" (1.6 m)   Wt 51.7 kg (114 lb)   BMI 20.19 kg/m²     Physical Exam  Vitals and nursing note reviewed. Constitutional:       General: She is not in acute distress. Appearance: Normal appearance. She is not ill-appearing, toxic-appearing or diaphoretic. HENT:      Head: Normocephalic and atraumatic. Right Ear: External ear normal.      Left Ear: External ear normal.      Nose: Nose normal.      Mouth/Throat:      Mouth: Mucous membranes are moist.   Eyes:      General: No scleral icterus. Extraocular Movements: Extraocular movements intact.       Conjunctiva/sclera: Conjunctivae normal.      Pupils: Pupils are equal, round, and reactive to light. Cardiovascular:      Rate and Rhythm: Normal rate and regular rhythm. Pulses: Normal pulses. Heart sounds: Normal heart sounds. Pulmonary:      Effort: Pulmonary effort is normal. No respiratory distress. Breath sounds: Normal breath sounds. No wheezing. Abdominal:      General: Bowel sounds are normal. There is no distension. Palpations: Abdomen is soft. Tenderness: There is no abdominal tenderness. Musculoskeletal:      Cervical back: Normal range of motion and neck supple. Right lower leg: No edema. Left lower leg: No edema. Lymphadenopathy:      Cervical: No cervical adenopathy. Skin:     General: Skin is warm and dry. Coloration: Skin is not jaundiced or pale. Neurological:      General: No focal deficit present. Mental Status: She is alert and oriented to person, place, and time. Mental status is at baseline. Cranial Nerves: No cranial nerve deficit. Psychiatric:         Mood and Affect: Mood normal.         Behavior: Behavior normal.         Thought Content:  Thought content normal.         Judgment: Judgment normal.   Skip Ashley, DO

## 2023-12-01 ENCOUNTER — TELEPHONE (OUTPATIENT)
Dept: FAMILY MEDICINE CLINIC | Facility: CLINIC | Age: 78
End: 2023-12-01

## 2023-12-01 DIAGNOSIS — G43.001 MIGRAINE WITHOUT AURA AND WITH STATUS MIGRAINOSUS, NOT INTRACTABLE: Primary | ICD-10-CM

## 2023-12-01 DIAGNOSIS — G25.81 RLS (RESTLESS LEGS SYNDROME): Primary | ICD-10-CM

## 2023-12-01 RX ORDER — PRAMIPEXOLE DIHYDROCHLORIDE 0.12 MG/1
0.12 TABLET ORAL DAILY
Qty: 30 TABLET | Refills: 5 | Status: SHIPPED | OUTPATIENT
Start: 2023-12-01

## 2023-12-01 RX ORDER — BUTALBITAL, ACETAMINOPHEN AND CAFFEINE 300; 40; 50 MG/1; MG/1; MG/1
1 CAPSULE ORAL EVERY 12 HOURS PRN
Qty: 20 CAPSULE | Refills: 0 | Status: SHIPPED | OUTPATIENT
Start: 2023-12-01

## 2023-12-01 NOTE — TELEPHONE ENCOUNTER
Pt stated was unsure of when fill was done.  It was long time ago from a pain doctor that she saw in the past.

## 2023-12-01 NOTE — TELEPHONE ENCOUNTER
Mirapex was sent I do not see fioricet on her list or on PDMP that she had taken in recent past - when did she get prior fill for that

## 2023-12-01 NOTE — TELEPHONE ENCOUNTER
Pt called [facility]vm stating:    "Hi, this is Yon Stevens. Dr. James Peterson had told me she was going to order me a refill for Mirapex. MIRAPEX. I think it's 0.125 milligram tablets. She was also going to order me a Fioricet which is caffeine and and let's see here it's for migraines. She was going to order both of them for me and when I went into the pharmacy they only had my digestion medication. So if you could find out why and then just order it out for me. Thank you. Bye."    Please advise.

## 2023-12-14 ENCOUNTER — APPOINTMENT (OUTPATIENT)
Dept: LAB | Facility: CLINIC | Age: 78
End: 2023-12-14
Payer: MEDICARE

## 2023-12-14 DIAGNOSIS — E78.2 MIXED HYPERLIPIDEMIA: ICD-10-CM

## 2023-12-14 DIAGNOSIS — K21.9 GASTROESOPHAGEAL REFLUX DISEASE, UNSPECIFIED WHETHER ESOPHAGITIS PRESENT: ICD-10-CM

## 2023-12-14 DIAGNOSIS — E03.9 HYPOTHYROIDISM, UNSPECIFIED TYPE: ICD-10-CM

## 2023-12-14 LAB
ALBUMIN SERPL BCP-MCNC: 4.1 G/DL (ref 3.5–5)
ALP SERPL-CCNC: 76 U/L (ref 34–104)
ALT SERPL W P-5'-P-CCNC: 11 U/L (ref 7–52)
ANION GAP SERPL CALCULATED.3IONS-SCNC: 6 MMOL/L
AST SERPL W P-5'-P-CCNC: 20 U/L (ref 13–39)
BILIRUB SERPL-MCNC: 0.61 MG/DL (ref 0.2–1)
BUN SERPL-MCNC: 8 MG/DL (ref 5–25)
CALCIUM SERPL-MCNC: 9 MG/DL (ref 8.4–10.2)
CHLORIDE SERPL-SCNC: 104 MMOL/L (ref 96–108)
CO2 SERPL-SCNC: 29 MMOL/L (ref 21–32)
CREAT SERPL-MCNC: 0.67 MG/DL (ref 0.6–1.3)
ERYTHROCYTE [DISTWIDTH] IN BLOOD BY AUTOMATED COUNT: 12.3 % (ref 11.6–15.1)
GFR SERPL CREATININE-BSD FRML MDRD: 83 ML/MIN/1.73SQ M
GLUCOSE P FAST SERPL-MCNC: 101 MG/DL (ref 65–99)
HCT VFR BLD AUTO: 41.7 % (ref 34.8–46.1)
HGB BLD-MCNC: 13.8 G/DL (ref 11.5–15.4)
LDLC SERPL DIRECT ASSAY-MCNC: 71 MG/DL (ref 0–100)
MCH RBC QN AUTO: 30.9 PG (ref 26.8–34.3)
MCHC RBC AUTO-ENTMCNC: 33.1 G/DL (ref 31.4–37.4)
MCV RBC AUTO: 94 FL (ref 82–98)
PLATELET # BLD AUTO: 294 THOUSANDS/UL (ref 149–390)
PMV BLD AUTO: 9.6 FL (ref 8.9–12.7)
POTASSIUM SERPL-SCNC: 4.4 MMOL/L (ref 3.5–5.3)
PROT SERPL-MCNC: 6 G/DL (ref 6.4–8.4)
RBC # BLD AUTO: 4.46 MILLION/UL (ref 3.81–5.12)
SODIUM SERPL-SCNC: 139 MMOL/L (ref 135–147)
TSH SERPL DL<=0.05 MIU/L-ACNC: 2.41 UIU/ML (ref 0.45–4.5)
WBC # BLD AUTO: 5.18 THOUSAND/UL (ref 4.31–10.16)

## 2023-12-14 PROCEDURE — 36415 COLL VENOUS BLD VENIPUNCTURE: CPT

## 2023-12-14 PROCEDURE — 84443 ASSAY THYROID STIM HORMONE: CPT

## 2023-12-14 PROCEDURE — 80053 COMPREHEN METABOLIC PANEL: CPT

## 2023-12-14 PROCEDURE — 83721 ASSAY OF BLOOD LIPOPROTEIN: CPT

## 2023-12-14 PROCEDURE — 85027 COMPLETE CBC AUTOMATED: CPT

## 2023-12-15 DIAGNOSIS — E03.9 ACQUIRED HYPOTHYROIDISM: ICD-10-CM

## 2023-12-15 RX ORDER — LEVOTHYROXINE SODIUM 50 MCG
50 TABLET ORAL DAILY
Qty: 90 TABLET | Refills: 3 | Status: SHIPPED | OUTPATIENT
Start: 2023-12-15

## 2024-01-23 ENCOUNTER — TELEPHONE (OUTPATIENT)
Dept: FAMILY MEDICINE CLINIC | Facility: CLINIC | Age: 79
End: 2024-01-23

## 2024-01-23 DIAGNOSIS — F51.01 PRIMARY INSOMNIA: Primary | ICD-10-CM

## 2024-01-23 RX ORDER — TRAZODONE HYDROCHLORIDE 50 MG/1
50 TABLET ORAL
Qty: 30 TABLET | Refills: 5 | Status: SHIPPED | OUTPATIENT
Start: 2024-01-23

## 2024-02-21 PROBLEM — Z00.00 MEDICARE ANNUAL WELLNESS VISIT, SUBSEQUENT: Status: RESOLVED | Noted: 2019-01-08 | Resolved: 2024-02-21

## 2024-03-06 ENCOUNTER — TELEPHONE (OUTPATIENT)
Dept: FAMILY MEDICINE CLINIC | Facility: CLINIC | Age: 79
End: 2024-03-06

## 2024-03-06 DIAGNOSIS — U07.1 COVID-19: Primary | ICD-10-CM

## 2024-03-06 DIAGNOSIS — U07.1 COVID-19: ICD-10-CM

## 2024-03-06 RX ORDER — NIRMATRELVIR AND RITONAVIR 300-100 MG
3 KIT ORAL 2 TIMES DAILY
Qty: 30 TABLET | Refills: 0 | Status: CANCELLED | OUTPATIENT
Start: 2024-03-06 | End: 2024-03-11

## 2024-03-06 RX ORDER — NIRMATRELVIR AND RITONAVIR 300-100 MG
3 KIT ORAL 2 TIMES DAILY
Qty: 30 TABLET | Refills: 0 | Status: SHIPPED | OUTPATIENT
Start: 2024-03-06 | End: 2024-03-11

## 2024-03-06 RX ORDER — NIRMATRELVIR AND RITONAVIR 300-100 MG
3 KIT ORAL 2 TIMES DAILY
Qty: 30 TABLET | Refills: 0 | Status: SHIPPED | OUTPATIENT
Start: 2024-03-06 | End: 2024-03-06 | Stop reason: SDUPTHER

## 2024-03-06 NOTE — TELEPHONE ENCOUNTER
Pt started with covid symptoms today and tested positive.  Would like a prescription for Paxlovid to [facility], [place]

## 2024-04-26 ENCOUNTER — RA CDI HCC (OUTPATIENT)
Dept: OTHER | Facility: HOSPITAL | Age: 79
End: 2024-04-26

## 2024-05-03 ENCOUNTER — OFFICE VISIT (OUTPATIENT)
Dept: FAMILY MEDICINE CLINIC | Facility: CLINIC | Age: 79
End: 2024-05-03
Payer: MEDICARE

## 2024-05-03 VITALS
HEIGHT: 63 IN | OXYGEN SATURATION: 99 % | WEIGHT: 111.2 LBS | BODY MASS INDEX: 19.7 KG/M2 | SYSTOLIC BLOOD PRESSURE: 124 MMHG | TEMPERATURE: 97.7 F | DIASTOLIC BLOOD PRESSURE: 78 MMHG | HEART RATE: 74 BPM | RESPIRATION RATE: 18 BRPM

## 2024-05-03 DIAGNOSIS — Z00.00 MEDICARE ANNUAL WELLNESS VISIT, SUBSEQUENT: Primary | ICD-10-CM

## 2024-05-03 DIAGNOSIS — E03.9 HYPOTHYROIDISM, UNSPECIFIED TYPE: ICD-10-CM

## 2024-05-03 PROCEDURE — G0439 PPPS, SUBSEQ VISIT: HCPCS | Performed by: INTERNAL MEDICINE

## 2024-05-03 NOTE — PROGRESS NOTES
Assessment and Plan:     Problem List Items Addressed This Visit          Endocrine    Hypothyroidism    Relevant Orders    TSH, 3rd generation with Free T4 reflex       Other    Medicare annual wellness visit, subsequent - Primary    Relevant Orders    Comprehensive metabolic panel    Lipid panel   Rx for fbw  Setup Dexa - pt will get at [facility]   Pt and her  will be moving end of May to [place]   She does not plan repeat colonoscopy and has not had issues   Prn followup    Depression Screening and Follow-up Plan: Patient was screened for depression during today's encounter. They screened negative with a PHQ-2 score of 0.  Preventive health issues were discussed with patient, and age appropriate screening tests were ordered as noted in patient's After Visit Summary.  Personalized health advice and appropriate referrals for health education or preventive services given if needed, as noted in patient's After Visit Summary.     History of Present Illness:     Patient presents for a Medicare Wellness Visit    HPI   Pt doing ok She has sold her home and will be moving end of May closer to her daughter No acute issues She is busy getting the house ready and prepping for her move   Patient Care Team:  Judy Prather DO as PCP - General  DO Rm Olivera CRNP Ayaz Matin, MD Berhanu Geme, MD as Endoscopist  Lolis Mclean DO (Gastroenterology)     Review of Systems:     Review of Systems   Constitutional:  Negative for chills and fever.   HENT: Negative.     Eyes:  Negative for visual disturbance.   Respiratory:  Negative for cough and shortness of breath.    Cardiovascular: Negative.    Gastrointestinal: Negative.    Genitourinary: Negative.    Musculoskeletal: Negative.    Neurological: Negative.    Psychiatric/Behavioral: Negative.          Problem List:     Patient Active Problem List   Diagnosis   • Allodynia   • Esophageal reflux   • Glaucoma   • Hyperlipidemia   •  Hypothyroidism   • Irritable bowel syndrome   • Migraine, unspecified, not intractable, without status migrainosus   • Osteoporosis   • Pernicious anemia   • Medicare annual wellness visit, subsequent   • Glaucoma of both eyes   • Visual loss, left eye   • Flu vaccine need   • Sleep disorder   • Nonrheumatic aortic valve insufficiency   • Benign essential HTN      Past Medical and Surgical History:     Past Medical History:   Diagnosis Date   • Anemia     pernicious   • Anxiety    • Aortic regurgitation    • Cancer (HCC)     colon, Last Assessed:  10/27/15   • Depression    • Disease of thyroid gland    • GERD (gastroesophageal reflux disease)    • Hyperlipidemia    • Migraine    • Osteoporosis      Past Surgical History:   Procedure Laterality Date   • APPENDECTOMY     • BREAST SURGERY Right     cyst   • COLON SURGERY  1998    hemicolectomy   • HYSTERECTOMY     • MOHS SURGERY      head   • OOPHORECTOMY     • ND COLONOSCOPY FLX DX W/COLLJ SPEC WHEN PFRMD N/A 3/18/2016    Procedure: COLONOSCOPY;  Surgeon: Brent Moore MD;  Location: [facility];  Service: Gastroenterology   • ND ESOPHAGOGASTRODUODENOSCOPY TRANSORAL DIAGNOSTIC N/A 3/18/2016    Procedure: ESOPHAGOGASTRODUODENOSCOPY (EGD);  Surgeon: Brent Moore MD;  Location: [facility];  Service: Gastroenterology   • TONSILLECTOMY  childhood      Family History:     Family History   Problem Relation Age of Onset   • Coronary artery disease Mother    • Colon cancer Mother    • Lung cancer Mother    • Other Mother         Stroke syndrome   • Throat cancer Father    • Breast cancer Family    • Coronary artery disease Family       Social History:     Social History     Socioeconomic History   • Marital status: /Civil Union     Spouse name: None   • Number of children: None   • Years of education: None   • Highest education level: None   Occupational History   • None   Tobacco Use   • Smoking status: Never   • Smokeless tobacco: Never   Vaping Use   • Vaping  status: Never Used   Substance and Sexual Activity   • Alcohol use: Yes     Comment: social   • Drug use: No   • Sexual activity: None   Other Topics Concern   • None   Social History Narrative    Always uses sunscreen    Caffeine use- 2-3 cups of coffee 1/2 and 1/2    Dental care, regularly    Lives independently with spouse    Uses safety equipment - seatbelts     Social Determinants of Health     Financial Resource Strain: Low Risk  (4/19/2023)    Overall Financial Resource Strain (CARDIA)    • Difficulty of Paying Living Expenses: Not very hard   Food Insecurity: No Food Insecurity (5/3/2024)    Hunger Vital Sign    • Worried About Running Out of Food in the Last Year: Never true    • Ran Out of Food in the Last Year: Never true   Transportation Needs: No Transportation Needs (5/3/2024)    PRAPARE - Transportation    • Lack of Transportation (Medical): No    • Lack of Transportation (Non-Medical): No   Physical Activity: Not on file   Stress: Not on file   Social Connections: Not on file   Intimate Partner Violence: Not on file   Housing Stability: Low Risk  (5/3/2024)    Housing Stability Vital Sign    • Unable to Pay for Housing in the Last Year: No    • Number of Places Lived in the Last Year: 1    • Unstable Housing in the Last Year: No      Medications and Allergies:     Current Outpatient Medications   Medication Sig Dispense Refill   • AZOPT 1 % ophthalmic suspension      • Butalbital-APAP-Caffeine (Fioricet) -40 MG CAPS Take 1 tablet by mouth every 12 (twelve) hours as needed (headache) 20 capsule 0   • levocetirizine (XYZAL) 5 MG tablet Take 1 tablet (5 mg total) by mouth every evening (Patient taking differently: Take 5 mg by mouth every evening Take po daily prn) 30 tablet 5   • meclizine (ANTIVERT) 12.5 MG tablet Take 1 tablet (12.5 mg total) by mouth every 8 (eight) hours as needed for dizziness 30 tablet 3   • omeprazole (PriLOSEC) 20 mg delayed release capsule Take 1 capsule (20 mg total) by  mouth daily before breakfast 90 capsule 3   • pramipexole (MIRAPEX) 0.125 mg tablet Take 1 tablet (0.125 mg total) by mouth in the morning 30 tablet 5   • rosuvastatin (CRESTOR) 5 mg tablet Take 1 tablet (5 mg total) by mouth daily 90 tablet 3   • Synthroid 50 MCG tablet Take 1 tablet (50 mcg total) by mouth daily 90 tablet 3   • timolol (TIMOPTIC) 0.5 % ophthalmic solution Administer to the right eye   0     No current facility-administered medications for this visit.     Allergies   Allergen Reactions   • Alprazolam    • Avelox [Moxifloxacin]    • Demerol [Meperidine]    • Erythromycin    • Lipitor [Atorvastatin] Diarrhea   • Prednisone    • Shellfish-Derived Products - Food Allergy       Immunizations:     Immunization History   Administered Date(s) Administered   • COVID-19 MODERNA VACC 0.5 ML IM 01/18/2021, 02/15/2021, 10/24/2021, 05/24/2022   • COVID-19 Moderna Vac BIVALENT 12 Yr+ IM 0.5 ML 09/14/2022, 05/04/2023   • COVID-19 Moderna mRNA Vaccine 12 Yr+ 50 mcg/0.5 mL (Spikevax) 09/28/2023   • INFLUENZA 01/01/2008, 10/19/2022   • Influenza Quadrivalent Preservative Free 3 years and older IM 10/27/2015   • Influenza Split High Dose Preservative Free IM 08/30/2017   • Influenza, high dose seasonal 0.7 mL 11/06/2018, 10/23/2019, 10/08/2020, 10/15/2021, 10/19/2022   • Influenza, seasonal, injectable 11/15/2013, 10/23/2014, 12/08/2016   • Pneumococcal Conjugate 13-Valent 01/08/2019   • Pneumococcal Conjugate Vaccine 20-valent (Pcv20), Polysace 10/19/2022   • Pneumococcal Polysaccharide PPV23 01/01/2007   • Td (adult), adsorbed 01/01/2000   • Zoster 12/17/2009      Health Maintenance:         Topic Date Due   • Colorectal Cancer Screening  01/22/2024   • Breast Cancer Screening: Mammogram  01/17/2025   • Hepatitis C Screening  Completed         Topic Date Due   • COVID-19 Vaccine (8 - 2023-24 season) 11/23/2023      Medicare Screening Tests and Risk Assessments:     Dacia is here for her Subsequent Wellness visit.  Last Medicare Wellness visit information reviewed, patient interviewed, no change since last AWV.     Health Risk Assessment:   Patient rates overall health as very good. Patient feels that their physical health rating is same. Patient is very satisfied with their life. Eyesight was rated as same. Hearing was rated as same. Patient feels that their emotional and mental health rating is same. Patients states they are never, rarely angry. Patient states they are sometimes unusually tired/fatigued. Pain experienced in the last 7 days has been none. Patient states that she has experienced no weight loss or gain in last 6 months.     Depression Screening:   PHQ-2 Score: 0      Fall Risk Screening:   In the past year, patient has experienced: no history of falling in past year      Urinary Incontinence Screening:   Patient has not leaked urine accidently in the last six months.     Home Safety:  Patient does not have trouble with stairs inside or outside of their home. Patient has working smoke alarms and has working carbon monoxide detector. Home safety hazards include: none.     Nutrition:   Current diet is Regular.     Medications:   Patient is currently taking over-the-counter supplements. OTC medications include: see medication list. Patient is able to manage medications.     Activities of Daily Living (ADLs)/Instrumental Activities of Daily Living (IADLs):   Walk and transfer into and out of bed and chair?: Yes  Dress and groom yourself?: Yes    Bathe or shower yourself?: Yes    Feed yourself? Yes  Do your laundry/housekeeping?: Yes  Manage your money, pay your bills and track your expenses?: Yes  Make your own meals?: Yes    Do your own shopping?: Yes    Previous Hospitalizations:   Any hospitalizations or ED visits within the last 12 months?: No      Advance Care Planning:   Living will: Yes    Durable POA for healthcare: Yes    Advanced directive: Yes    End of Life Decisions reviewed with patient: Yes     Provider agrees with end of life decisions: Yes      Cognitive Screening:   Provider or family/friend/caregiver concerned regarding cognition?: No    PREVENTIVE SCREENINGS      Cardiovascular Screening:    General: History Lipid Disorder and Risks and Benefits Discussed    Due for: Lipid Panel      Diabetes Screening:     General: Screening Not Indicated      Colorectal Cancer Screening:     General: Patient Declines      Breast Cancer Screening:     General: Screening Current      Cervical Cancer Screening:    General: Screening Not Indicated      Osteoporosis Screening:    General: History Osteoporosis and Risks and Benefits Discussed    Due for: DXA Axial      Abdominal Aortic Aneurysm (AAA) Screening:        General: Screening Current      Lung Cancer Screening:     General: Screening Not Indicated      Hepatitis C Screening:    General: Screening Current    Screening, Brief Intervention, and Referral to Treatment (SBIRT)    Screening  Typical number of drinks in a day: 0  Typical number of drinks in a week: 0  Interpretation: Low risk drinking behavior.    AUDIT-C Screenin) How often did you have a drink containing alcohol in the past year? never  2) How many drinks did you have on a typical day when you were drinking in the past year? 0  3) How often did you have 6 or more drinks on one occasion in the past year? never    AUDIT-C Score: 0  Interpretation: Score 0-2 (female): Negative screen for alcohol misuse    Single Item Drug Screening:  How often have you used an illegal drug (including marijuana) or a prescription medication for non-medical reasons in the past year? never    Single Item Drug Screen Score: 0  Interpretation: Negative screen for possible drug use disorder    Brief Intervention  Alcohol & drug use screenings were reviewed. No concerns regarding substance use disorder identified.     Other Counseling Topics:   Regular weightbearing exercise and calcium and vitamin D intake.     No  "results found.     Physical Exam:     /78   Pulse 74   Temp 97.7 °F (36.5 °C) (Temporal)   Resp 18   Ht 5' 3\" (1.6 m)   Wt 50.4 kg (111 lb 3.2 oz)   SpO2 99%   BMI 19.70 kg/m²     Physical Exam  Vitals and nursing note reviewed.   Constitutional:       General: She is not in acute distress.     Appearance: Normal appearance. She is not ill-appearing, toxic-appearing or diaphoretic.   HENT:      Head: Normocephalic and atraumatic.      Right Ear: External ear normal.      Left Ear: External ear normal.      Nose: Nose normal.      Mouth/Throat:      Mouth: Mucous membranes are dry.   Eyes:      General: No scleral icterus.  Cardiovascular:      Rate and Rhythm: Normal rate and regular rhythm.      Pulses: Normal pulses.      Heart sounds: Normal heart sounds.   Pulmonary:      Effort: Pulmonary effort is normal. No respiratory distress.      Breath sounds: Normal breath sounds. No wheezing.   Abdominal:      General: Bowel sounds are normal. There is no distension.      Palpations: Abdomen is soft.      Tenderness: There is no abdominal tenderness.   Musculoskeletal:         General: Normal range of motion.      Cervical back: Normal range of motion and neck supple.   Lymphadenopathy:      Cervical: No cervical adenopathy.   Skin:     General: Skin is warm and dry.      Coloration: Skin is not jaundiced or pale.   Neurological:      General: No focal deficit present.      Mental Status: She is alert and oriented to person, place, and time. Mental status is at baseline.   Psychiatric:         Mood and Affect: Mood normal.         Behavior: Behavior normal.         Thought Content: Thought content normal.         Judgment: Judgment normal.        Judy Prather, DO  "

## 2024-05-03 NOTE — PATIENT INSTRUCTIONS
Medicare Preventive Visit Patient Instructions  Thank you for completing your Welcome to Medicare Visit or Medicare Annual Wellness Visit today. Your next wellness visit will be due in one year (5/4/2025).  The screening/preventive services that you may require over the next 5-10 years are detailed below. Some tests may not apply to you based off risk factors and/or age. Screening tests ordered at today's visit but not completed yet may show as past due. Also, please note that scanned in results may not display below.  Preventive Screenings:  Service Recommendations Previous Testing/Comments   Colorectal Cancer Screening  * Colonoscopy    * Fecal Occult Blood Test (FOBT)/Fecal Immunochemical Test (FIT)  * Fecal DNA/Cologuard Test  * Flexible Sigmoidoscopy Age: 45-75 years old   Colonoscopy: every 10 years (may be performed more frequently if at higher risk)  OR  FOBT/FIT: every 1 year  OR  Cologuard: every 3 years  OR  Sigmoidoscopy: every 5 years  Screening may be recommended earlier than age 45 if at higher risk for colorectal cancer. Also, an individualized decision between you and your healthcare provider will decide whether screening between the ages of 76-85 would be appropriate. Colonoscopy: 01/22/2019  FOBT/FIT: Not on file  Cologuard: Not on file  Sigmoidoscopy: Not on file          Breast Cancer Screening Age: 40+ years old  Frequency: every 1-2 years  Not required if history of left and right mastectomy Mammogram: 01/17/2024        Cervical Cancer Screening Between the ages of 21-29, pap smear recommended once every 3 years.   Between the ages of 30-65, can perform pap smear with HPV co-testing every 5 years.   Recommendations may differ for women with a history of total hysterectomy, cervical cancer, or abnormal pap smears in past. Pap Smear: Not on file        Hepatitis C Screening Once for adults born between 1945 and 1965  More frequently in patients at high risk for Hepatitis C Hep C Antibody:  06/22/2021        Diabetes Screening 1-2 times per year if you're at risk for diabetes or have pre-diabetes Fasting glucose: 101 mg/dL (12/14/2023)  A1C: No results in last 5 years (No results in last 5 years)      Cholesterol Screening Once every 5 years if you don't have a lipid disorder. May order more often based on risk factors. Lipid panel: 03/13/2023          Other Preventive Screenings Covered by Medicare:  Abdominal Aortic Aneurysm (AAA) Screening: covered once if your at risk. You're considered to be at risk if you have a family history of AAA.  Lung Cancer Screening: covers low dose CT scan once per year if you meet all of the following conditions: (1) Age 55-77; (2) No signs or symptoms of lung cancer; (3) Current smoker or have quit smoking within the last 15 years; (4) You have a tobacco smoking history of at least 20 pack years (packs per day multiplied by number of years you smoked); (5) You get a written order from a healthcare provider.  Glaucoma Screening: covered annually if you're considered high risk: (1) You have diabetes OR (2) Family history of glaucoma OR (3)  aged 50 and older OR (4)  American aged 65 and older  Osteoporosis Screening: covered every 2 years if you meet one of the following conditions: (1) You're estrogen deficient and at risk for osteoporosis based off medical history and other findings; (2) Have a vertebral abnormality; (3) On glucocorticoid therapy for more than 3 months; (4) Have primary hyperparathyroidism; (5) On osteoporosis medications and need to assess response to drug therapy.   Last bone density test (DXA Scan): Not on file.  HIV Screening: covered annually if you're between the age of 15-65. Also covered annually if you are younger than 15 and older than 65 with risk factors for HIV infection. For pregnant patients, it is covered up to 3 times per pregnancy.    Immunizations:  Immunization Recommendations   Influenza Vaccine Annual  influenza vaccination during flu season is recommended for all persons aged >= 6 months who do not have contraindications   Pneumococcal Vaccine   * Pneumococcal conjugate vaccine = PCV13 (Prevnar 13), PCV15 (Vaxneuvance), PCV20 (Prevnar 20)  * Pneumococcal polysaccharide vaccine = PPSV23 (Pneumovax) Adults 19-65 yo with certain risk factors or if 65+ yo  If never received any pneumonia vaccine: recommend Prevnar 20 (PCV20)  Give PCV20 if previously received 1 dose of PCV13 or PPSV23   Hepatitis B Vaccine 3 dose series if at intermediate or high risk (ex: diabetes, end stage renal disease, liver disease)   Respiratory syncytial virus (RSV) Vaccine - COVERED BY MEDICARE PART D  * RSVPreF3 (Arexvy) CDC recommends that adults 60 years of age and older may receive a single dose of RSV vaccine using shared clinical decision-making (SCDM)   Tetanus (Td) Vaccine - COST NOT COVERED BY MEDICARE PART B Following completion of primary series, a booster dose should be given every 10 years to maintain immunity against tetanus. Td may also be given as tetanus wound prophylaxis.   Tdap Vaccine - COST NOT COVERED BY MEDICARE PART B Recommended at least once for all adults. For pregnant patients, recommended with each pregnancy.   Shingles Vaccine (Shingrix) - COST NOT COVERED BY MEDICARE PART B  2 shot series recommended in those 19 years and older who have or will have weakened immune systems or those 50 years and older     Health Maintenance Due:      Topic Date Due   • Colorectal Cancer Screening  01/22/2024   • Breast Cancer Screening: Mammogram  01/17/2025   • Hepatitis C Screening  Completed     Immunizations Due:      Topic Date Due   • COVID-19 Vaccine (8 - 2023-24 season) 11/23/2023     Advance Directives   What are advance directives?  Advance directives are legal documents that state your wishes and plans for medical care. These plans are made ahead of time in case you lose your ability to make decisions for yourself.  Advance directives can apply to any medical decision, such as the treatments you want, and if you want to donate organs.   What are the types of advance directives?  There are many types of advance directives, and each state has rules about how to use them. You may choose a combination of any of the following:  Living will:  This is a written record of the treatment you want. You can also choose which treatments you do not want, which to limit, and which to stop at a certain time. This includes surgery, medicine, IV fluid, and tube feedings.   Durable power of  for healthcare (DPAHC):  This is a written record that states who you want to make healthcare choices for you when you are unable to make them for yourself. This person, called a proxy, is usually a family member or a friend. You may choose more than 1 proxy.  Do not resuscitate (DNR) order:  A DNR order is used in case your heart stops beating or you stop breathing. It is a request not to have certain forms of treatment, such as CPR. A DNR order may be included in other types of advance directives.  Medical directive:  This covers the care that you want if you are in a coma, near death, or unable to make decisions for yourself. You can list the treatments you want for each condition. Treatment may include pain medicine, surgery, blood transfusions, dialysis, IV or tube feedings, and a ventilator (breathing machine).  Values history:  This document has questions about your views, beliefs, and how you feel and think about life. This information can help others choose the care that you would choose.  Why are advance directives important?  An advance directive helps you control your care. Although spoken wishes may be used, it is better to have your wishes written down. Spoken wishes can be misunderstood, or not followed. Treatments may be given even if you do not want them. An advance directive may make it easier for your family to make difficult  choices about your care.       © Copyright [employer] 2018 Information is for End User's use only and may not be sold, redistributed or otherwise used for commercial purposes. All illustrations and images included in CareNotes® are the copyrighted property of [employer] or [employer]

## 2024-05-24 ENCOUNTER — APPOINTMENT (OUTPATIENT)
Dept: LAB | Facility: CLINIC | Age: 79
End: 2024-05-24
Payer: MEDICARE

## 2024-05-24 DIAGNOSIS — Z00.00 MEDICARE ANNUAL WELLNESS VISIT, SUBSEQUENT: ICD-10-CM

## 2024-05-24 DIAGNOSIS — E03.9 HYPOTHYROIDISM, UNSPECIFIED TYPE: ICD-10-CM

## 2024-05-24 LAB
ALBUMIN SERPL BCP-MCNC: 4 G/DL (ref 3.5–5)
ALP SERPL-CCNC: 85 U/L (ref 34–104)
ALT SERPL W P-5'-P-CCNC: 20 U/L (ref 7–52)
ANION GAP SERPL CALCULATED.3IONS-SCNC: 7 MMOL/L (ref 4–13)
AST SERPL W P-5'-P-CCNC: 28 U/L (ref 13–39)
BILIRUB SERPL-MCNC: 0.57 MG/DL (ref 0.2–1)
BUN SERPL-MCNC: 11 MG/DL (ref 5–25)
CALCIUM SERPL-MCNC: 9.1 MG/DL (ref 8.4–10.2)
CHLORIDE SERPL-SCNC: 102 MMOL/L (ref 96–108)
CHOLEST SERPL-MCNC: 154 MG/DL
CO2 SERPL-SCNC: 29 MMOL/L (ref 21–32)
CREAT SERPL-MCNC: 0.64 MG/DL (ref 0.6–1.3)
GFR SERPL CREATININE-BSD FRML MDRD: 85 ML/MIN/1.73SQ M
GLUCOSE P FAST SERPL-MCNC: 93 MG/DL (ref 65–99)
HDLC SERPL-MCNC: 66 MG/DL
LDLC SERPL CALC-MCNC: 74 MG/DL (ref 0–100)
NONHDLC SERPL-MCNC: 88 MG/DL
POTASSIUM SERPL-SCNC: 4.4 MMOL/L (ref 3.5–5.3)
PROT SERPL-MCNC: 6.1 G/DL (ref 6.4–8.4)
SODIUM SERPL-SCNC: 138 MMOL/L (ref 135–147)
T4 FREE SERPL-MCNC: 0.93 NG/DL (ref 0.61–1.12)
TRIGL SERPL-MCNC: 70 MG/DL
TSH SERPL DL<=0.05 MIU/L-ACNC: 4.99 UIU/ML (ref 0.45–4.5)

## 2024-05-24 PROCEDURE — 36415 COLL VENOUS BLD VENIPUNCTURE: CPT

## 2024-05-24 PROCEDURE — 80053 COMPREHEN METABOLIC PANEL: CPT

## 2024-05-24 PROCEDURE — 84443 ASSAY THYROID STIM HORMONE: CPT

## 2024-05-24 PROCEDURE — 80061 LIPID PANEL: CPT

## 2024-05-24 PROCEDURE — 84439 ASSAY OF FREE THYROXINE: CPT

## 2024-06-02 PROBLEM — Z00.00 MEDICARE ANNUAL WELLNESS VISIT, SUBSEQUENT: Status: RESOLVED | Noted: 2019-01-08 | Resolved: 2024-06-02

## 2025-02-20 ENCOUNTER — TELEPHONE (OUTPATIENT)
Dept: FAMILY MEDICINE CLINIC | Facility: CLINIC | Age: 80
End: 2025-02-20

## 2025-02-20 NOTE — TELEPHONE ENCOUNTER
Please remove patient from Dr. Prather's pane, she has moved out of the area per provider.  Thank you

## 2025-02-23 NOTE — TELEPHONE ENCOUNTER
02/22/25 10:57 PM        The office's request has been received, reviewed, and the patient chart updated. The PCP has successfully been removed with a patient attribution note. This message will now be completed.        Thank you  Larry Guy